# Patient Record
Sex: MALE | Race: BLACK OR AFRICAN AMERICAN | NOT HISPANIC OR LATINO | Employment: OTHER | URBAN - METROPOLITAN AREA
[De-identification: names, ages, dates, MRNs, and addresses within clinical notes are randomized per-mention and may not be internally consistent; named-entity substitution may affect disease eponyms.]

---

## 2023-03-29 DIAGNOSIS — N18.32 STAGE 3B CHRONIC KIDNEY DISEASE (MULTI): Primary | ICD-10-CM

## 2023-03-29 LAB
ALANINE AMINOTRANSFERASE (SGPT) (U/L) IN SER/PLAS: 14 U/L (ref 10–52)
ALBUMIN (G/DL) IN SER/PLAS: 3.9 G/DL (ref 3.4–5)
ALKALINE PHOSPHATASE (U/L) IN SER/PLAS: 72 U/L (ref 33–136)
ANION GAP IN SER/PLAS: 15 MMOL/L (ref 10–20)
ASPARTATE AMINOTRANSFERASE (SGOT) (U/L) IN SER/PLAS: 19 U/L (ref 9–39)
BILIRUBIN TOTAL (MG/DL) IN SER/PLAS: 0.8 MG/DL (ref 0–1.2)
CALCIUM (MG/DL) IN SER/PLAS: 9.4 MG/DL (ref 8.6–10.6)
CARBON DIOXIDE, TOTAL (MMOL/L) IN SER/PLAS: 24 MMOL/L (ref 21–32)
CHLORIDE (MMOL/L) IN SER/PLAS: 107 MMOL/L (ref 98–107)
CHOLESTEROL (MG/DL) IN SER/PLAS: 142 MG/DL (ref 0–199)
CHOLESTEROL IN HDL (MG/DL) IN SER/PLAS: 51.2 MG/DL
CHOLESTEROL/HDL RATIO: 2.8
CREATININE (MG/DL) IN SER/PLAS: 1.95 MG/DL (ref 0.5–1.3)
ERYTHROCYTE DISTRIBUTION WIDTH (RATIO) BY AUTOMATED COUNT: 11.9 % (ref 11.5–14.5)
ERYTHROCYTE MEAN CORPUSCULAR HEMOGLOBIN CONCENTRATION (G/DL) BY AUTOMATED: 31.5 G/DL (ref 32–36)
ERYTHROCYTE MEAN CORPUSCULAR VOLUME (FL) BY AUTOMATED COUNT: 91 FL (ref 80–100)
ERYTHROCYTES (10*6/UL) IN BLOOD BY AUTOMATED COUNT: 3.81 X10E12/L (ref 4.5–5.9)
ESTIMATED AVERAGE GLUCOSE FOR HBA1C: 128 MG/DL
GFR MALE: 37 ML/MIN/1.73M2
GLUCOSE (MG/DL) IN SER/PLAS: 117 MG/DL (ref 74–99)
HEMATOCRIT (%) IN BLOOD BY AUTOMATED COUNT: 34.6 % (ref 41–52)
HEMOGLOBIN (G/DL) IN BLOOD: 10.9 G/DL (ref 13.5–17.5)
HEMOGLOBIN A1C/HEMOGLOBIN TOTAL IN BLOOD: 6.1 %
LDL: 79 MG/DL (ref 0–99)
LEUKOCYTES (10*3/UL) IN BLOOD BY AUTOMATED COUNT: 6.8 X10E9/L (ref 4.4–11.3)
NRBC (PER 100 WBCS) BY AUTOMATED COUNT: 0 /100 WBC (ref 0–0)
PLATELETS (10*3/UL) IN BLOOD AUTOMATED COUNT: 168 X10E9/L (ref 150–450)
POTASSIUM (MMOL/L) IN SER/PLAS: 4.6 MMOL/L (ref 3.5–5.3)
PROSTATE SPECIFIC ANTIGEN,SCREEN: 1.57 NG/ML (ref 0–4)
PROTEIN TOTAL: 7 G/DL (ref 6.4–8.2)
SODIUM (MMOL/L) IN SER/PLAS: 141 MMOL/L (ref 136–145)
TRIGLYCERIDE (MG/DL) IN SER/PLAS: 59 MG/DL (ref 0–149)
UREA NITROGEN (MG/DL) IN SER/PLAS: 42 MG/DL (ref 6–23)
VLDL: 12 MG/DL (ref 0–40)

## 2023-04-09 DIAGNOSIS — N28.89 KIDNEY MASS: Primary | ICD-10-CM

## 2023-04-29 ENCOUNTER — APPOINTMENT (OUTPATIENT)
Dept: LAB | Facility: LAB | Age: 68
End: 2023-04-29
Payer: COMMERCIAL

## 2023-04-29 LAB
ANION GAP IN SER/PLAS: 13 MMOL/L (ref 10–20)
CALCIUM (MG/DL) IN SER/PLAS: 9.5 MG/DL (ref 8.6–10.6)
CARBON DIOXIDE, TOTAL (MMOL/L) IN SER/PLAS: 21 MMOL/L (ref 21–32)
CHLORIDE (MMOL/L) IN SER/PLAS: 109 MMOL/L (ref 98–107)
CREATININE (MG/DL) IN SER/PLAS: 2.56 MG/DL (ref 0.5–1.3)
GFR MALE: 27 ML/MIN/1.73M2
GLUCOSE (MG/DL) IN SER/PLAS: 110 MG/DL (ref 74–99)
POTASSIUM (MMOL/L) IN SER/PLAS: 6 MMOL/L (ref 3.5–5.3)
SODIUM (MMOL/L) IN SER/PLAS: 137 MMOL/L (ref 136–145)
UREA NITROGEN (MG/DL) IN SER/PLAS: 70 MG/DL (ref 6–23)

## 2023-07-06 ENCOUNTER — APPOINTMENT (OUTPATIENT)
Dept: LAB | Facility: LAB | Age: 68
End: 2023-07-06
Payer: COMMERCIAL

## 2023-07-06 LAB
ANION GAP IN SER/PLAS: 13 MMOL/L (ref 10–20)
CALCIUM (MG/DL) IN SER/PLAS: 9.2 MG/DL (ref 8.6–10.6)
CARBON DIOXIDE, TOTAL (MMOL/L) IN SER/PLAS: 25 MMOL/L (ref 21–32)
CHLORIDE (MMOL/L) IN SER/PLAS: 106 MMOL/L (ref 98–107)
CREATININE (MG/DL) IN SER/PLAS: 2.96 MG/DL (ref 0.5–1.3)
ERYTHROCYTE DISTRIBUTION WIDTH (RATIO) BY AUTOMATED COUNT: 12.8 % (ref 11.5–14.5)
ERYTHROCYTE MEAN CORPUSCULAR HEMOGLOBIN CONCENTRATION (G/DL) BY AUTOMATED: 31.4 G/DL (ref 32–36)
ERYTHROCYTE MEAN CORPUSCULAR VOLUME (FL) BY AUTOMATED COUNT: 94 FL (ref 80–100)
ERYTHROCYTES (10*6/UL) IN BLOOD BY AUTOMATED COUNT: 3.19 X10E12/L (ref 4.5–5.9)
GFR MALE: 23 ML/MIN/1.73M2
GLUCOSE (MG/DL) IN SER/PLAS: 107 MG/DL (ref 74–99)
HEMATOCRIT (%) IN BLOOD BY AUTOMATED COUNT: 29.9 % (ref 41–52)
HEMOGLOBIN (G/DL) IN BLOOD: 9.4 G/DL (ref 13.5–17.5)
LEUKOCYTES (10*3/UL) IN BLOOD BY AUTOMATED COUNT: 10.9 X10E9/L (ref 4.4–11.3)
NRBC (PER 100 WBCS) BY AUTOMATED COUNT: 0 /100 WBC (ref 0–0)
PLATELETS (10*3/UL) IN BLOOD AUTOMATED COUNT: 314 X10E9/L (ref 150–450)
POTASSIUM (MMOL/L) IN SER/PLAS: 5.3 MMOL/L (ref 3.5–5.3)
SODIUM (MMOL/L) IN SER/PLAS: 139 MMOL/L (ref 136–145)
UREA NITROGEN (MG/DL) IN SER/PLAS: 42 MG/DL (ref 6–23)

## 2023-07-07 LAB — CREATININE (MG/DL) IN BODY FLUID: 4.94 MG/DL

## 2023-07-17 LAB
ALANINE AMINOTRANSFERASE (SGPT) (U/L) IN SER/PLAS: 19 U/L (ref 10–52)
ALBUMIN (G/DL) IN SER/PLAS: 3.7 G/DL (ref 3.4–5)
ALKALINE PHOSPHATASE (U/L) IN SER/PLAS: 89 U/L (ref 33–136)
ANION GAP IN SER/PLAS: 12 MMOL/L (ref 10–20)
ASPARTATE AMINOTRANSFERASE (SGOT) (U/L) IN SER/PLAS: 24 U/L (ref 9–39)
BILIRUBIN TOTAL (MG/DL) IN SER/PLAS: 0.6 MG/DL (ref 0–1.2)
CALCIUM (MG/DL) IN SER/PLAS: 9.6 MG/DL (ref 8.6–10.6)
CARBON DIOXIDE, TOTAL (MMOL/L) IN SER/PLAS: 19 MMOL/L (ref 21–32)
CHLORIDE (MMOL/L) IN SER/PLAS: 109 MMOL/L (ref 98–107)
CREATININE (MG/DL) IN SER/PLAS: 2.3 MG/DL (ref 0.5–1.3)
GFR MALE: 31 ML/MIN/1.73M2
GLUCOSE (MG/DL) IN SER/PLAS: 104 MG/DL (ref 74–99)
POTASSIUM (MMOL/L) IN SER/PLAS: 5.4 MMOL/L (ref 3.5–5.3)
PROTEIN TOTAL: 6.9 G/DL (ref 6.4–8.2)
SODIUM (MMOL/L) IN SER/PLAS: 135 MMOL/L (ref 136–145)
UREA NITROGEN (MG/DL) IN SER/PLAS: 40 MG/DL (ref 6–23)

## 2023-07-26 LAB
ANION GAP IN SER/PLAS: 17 MMOL/L (ref 10–20)
CALCIUM (MG/DL) IN SER/PLAS: 9.9 MG/DL (ref 8.6–10.6)
CARBON DIOXIDE, TOTAL (MMOL/L) IN SER/PLAS: 20 MMOL/L (ref 21–32)
CHLORIDE (MMOL/L) IN SER/PLAS: 106 MMOL/L (ref 98–107)
CREATININE (MG/DL) IN SER/PLAS: 2.08 MG/DL (ref 0.5–1.3)
GFR MALE: 35 ML/MIN/1.73M2
GLUCOSE (MG/DL) IN SER/PLAS: 122 MG/DL (ref 74–99)
POTASSIUM (MMOL/L) IN SER/PLAS: 4.6 MMOL/L (ref 3.5–5.3)
SODIUM (MMOL/L) IN SER/PLAS: 138 MMOL/L (ref 136–145)
UREA NITROGEN (MG/DL) IN SER/PLAS: 47 MG/DL (ref 6–23)

## 2023-08-08 LAB
ANION GAP IN SER/PLAS: 11 MMOL/L (ref 10–20)
CALCIUM (MG/DL) IN SER/PLAS: 9.8 MG/DL (ref 8.6–10.6)
CARBON DIOXIDE, TOTAL (MMOL/L) IN SER/PLAS: 28 MMOL/L (ref 21–32)
CHLORIDE (MMOL/L) IN SER/PLAS: 106 MMOL/L (ref 98–107)
CREATININE (MG/DL) IN SER/PLAS: 1.83 MG/DL (ref 0.5–1.3)
GFR MALE: 40 ML/MIN/1.73M2
GLUCOSE (MG/DL) IN SER/PLAS: 106 MG/DL (ref 74–99)
POTASSIUM (MMOL/L) IN SER/PLAS: 4.6 MMOL/L (ref 3.5–5.3)
SODIUM (MMOL/L) IN SER/PLAS: 140 MMOL/L (ref 136–145)
UREA NITROGEN (MG/DL) IN SER/PLAS: 35 MG/DL (ref 6–23)

## 2023-08-28 DIAGNOSIS — E78.5 DYSLIPIDEMIA: ICD-10-CM

## 2023-08-29 RX ORDER — ATORVASTATIN CALCIUM 80 MG/1
80 TABLET, FILM COATED ORAL NIGHTLY
Qty: 90 TABLET | Refills: 0 | Status: SHIPPED | OUTPATIENT
Start: 2023-08-29 | End: 2023-11-20

## 2023-09-06 ENCOUNTER — OFFICE VISIT (OUTPATIENT)
Dept: PRIMARY CARE | Facility: CLINIC | Age: 68
End: 2023-09-06
Payer: COMMERCIAL

## 2023-09-06 VITALS
BODY MASS INDEX: 21.97 KG/M2 | WEIGHT: 132 LBS | DIASTOLIC BLOOD PRESSURE: 66 MMHG | HEART RATE: 62 BPM | SYSTOLIC BLOOD PRESSURE: 134 MMHG

## 2023-09-06 DIAGNOSIS — N18.31 STAGE 3A CHRONIC KIDNEY DISEASE (MULTI): ICD-10-CM

## 2023-09-06 DIAGNOSIS — I10 BENIGN ESSENTIAL HYPERTENSION: ICD-10-CM

## 2023-09-06 DIAGNOSIS — I25.10 ARTERIOSCLEROSIS OF CORONARY ARTERY: ICD-10-CM

## 2023-09-06 DIAGNOSIS — C64.2 RENAL CELL CARCINOMA, LEFT (MULTI): Primary | ICD-10-CM

## 2023-09-06 DIAGNOSIS — R73.9 HYPERGLYCEMIA: ICD-10-CM

## 2023-09-06 DIAGNOSIS — R73.01 IMPAIRED FASTING GLUCOSE: ICD-10-CM

## 2023-09-06 DIAGNOSIS — I50.22 CHRONIC SYSTOLIC CONGESTIVE HEART FAILURE (MULTI): ICD-10-CM

## 2023-09-06 DIAGNOSIS — Z23 NEEDS FLU SHOT: ICD-10-CM

## 2023-09-06 PROBLEM — N28.89 RIGHT RENAL MASS: Status: ACTIVE | Noted: 2023-09-06

## 2023-09-06 PROBLEM — D64.9 ANEMIA: Status: ACTIVE | Noted: 2023-09-06

## 2023-09-06 PROBLEM — E78.5 DYSLIPIDEMIA: Status: ACTIVE | Noted: 2023-01-31

## 2023-09-06 PROBLEM — E79.0 HYPERURICEMIA: Status: ACTIVE | Noted: 2023-09-06

## 2023-09-06 PROBLEM — N28.89 RIGHT RENAL MASS: Status: RESOLVED | Noted: 2023-09-06 | Resolved: 2023-09-06

## 2023-09-06 PROBLEM — N18.30 KIDNEY DISEASE, CHRONIC, STAGE III (GFR 30-59 ML/MIN) (MULTI): Status: ACTIVE | Noted: 2023-09-06

## 2023-09-06 PROCEDURE — 3075F SYST BP GE 130 - 139MM HG: CPT | Performed by: INTERNAL MEDICINE

## 2023-09-06 PROCEDURE — 90662 IIV NO PRSV INCREASED AG IM: CPT | Performed by: INTERNAL MEDICINE

## 2023-09-06 PROCEDURE — 1126F AMNT PAIN NOTED NONE PRSNT: CPT | Performed by: INTERNAL MEDICINE

## 2023-09-06 PROCEDURE — 1036F TOBACCO NON-USER: CPT | Performed by: INTERNAL MEDICINE

## 2023-09-06 PROCEDURE — 1160F RVW MEDS BY RX/DR IN RCRD: CPT | Performed by: INTERNAL MEDICINE

## 2023-09-06 PROCEDURE — 3078F DIAST BP <80 MM HG: CPT | Performed by: INTERNAL MEDICINE

## 2023-09-06 PROCEDURE — G0008 ADMIN INFLUENZA VIRUS VAC: HCPCS | Performed by: INTERNAL MEDICINE

## 2023-09-06 PROCEDURE — 1159F MED LIST DOCD IN RCRD: CPT | Performed by: INTERNAL MEDICINE

## 2023-09-06 PROCEDURE — 99214 OFFICE O/P EST MOD 30 MIN: CPT | Performed by: INTERNAL MEDICINE

## 2023-09-06 RX ORDER — LISINOPRIL 20 MG/1
20 TABLET ORAL DAILY
COMMUNITY
End: 2024-03-04

## 2023-09-06 RX ORDER — SPIRONOLACTONE 25 MG/1
25 TABLET ORAL DAILY
COMMUNITY
End: 2023-10-10 | Stop reason: SDUPTHER

## 2023-09-06 ASSESSMENT — PATIENT HEALTH QUESTIONNAIRE - PHQ9
SUM OF ALL RESPONSES TO PHQ9 QUESTIONS 1 AND 2: 0
2. FEELING DOWN, DEPRESSED OR HOPELESS: NOT AT ALL
1. LITTLE INTEREST OR PLEASURE IN DOING THINGS: NOT AT ALL

## 2023-09-06 NOTE — ASSESSMENT & PLAN NOTE
Impaired fasting glucose with A1c of 6.1 in March 2023.  We will recheck.  Advise low carbohydrate diet.  We will call with results of test

## 2023-09-06 NOTE — ASSESSMENT & PLAN NOTE
Status post partial nephrectomy.  Check renal function.  Continue following with urology for surveillance

## 2023-09-06 NOTE — ASSESSMENT & PLAN NOTE
Improvement of ejection fraction on current medications.  Continue Jardiance Lipitor lisinopril and spironolactone.  Check renal function to ensure potassium levels are within normal range.  Check weights daily and call if weight increases by 3 to 5 pounds

## 2023-09-06 NOTE — PROGRESS NOTES
Subjective   Patient ID: Keaton Blanco is a 67 y.o. male who presents for Follow-up.    HPI     Patient is a 67-year-old male with past medical history of systolic congestive heart failure coronary artery disease hyperglycemia renal cell carcinoma status post partial nephrectomy anemia hypertension who presents for follow-up.    Last month patient had a partial nephrectomy with successful removal of the RCC tumor.  He has a surveillance schedule with his urologist with plan for repeat imaging upcoming.  He did have a little bit of reduction in his GFR he is due for renal function at this time.  He was on spironolactone however was held due to hyper kalemia.  He needs a repeat renal function he is back on the spironolactone as well as his other medications for his congestive heart failure.    Systolic congestive heart failure.  No recent weight gain.  Recent echocardiogram improved to 40 to 45% EF.  He has been exercising more and no shortness of breath on exertion or orthopnea.    Chronic kidney disease stage III.  Likely partial nephrectomy contributing.  He is on ACE inhibitor as well as Farxiga    Review of Systems  Constitutional: No fever or chills  Cardiovascular: no chest pain, no palpitations and no syncope.   Respiratory: no cough, no shortness of breath during exertion and no shortness of breath at rest.   Gastrointestinal: no abdominal pain, no nausea and no vomiting.  Neuro: No Headache, no dizziness    Objective   /66   Pulse 62   Wt 59.9 kg (132 lb)   BMI 21.97 kg/m²     Physical Exam  Constitutional: Alert and in no acute distress. Well developed, well nourished  Head and Face: Head and face: Normal.    Cardiovascular: Heart rate and rhythm were normal, normal S1 and S2. No peripheral edema.   Pulmonary: No respiratory distress. Clear bilateral breath sounds.  Musculoskeletal: Gait and station: Normal. Muscle strength/tone: Normal.   Skin: Normal skin color and pigmentation, normal skin  turgor, and no rash.    Psychiatric: Judgment and insight: Intact. Mood and affect: Normal.        Lab Results   Component Value Date    WBC 9.5 07/13/2023    HGB 9.6 (L) 07/13/2023    HCT 29.1 (L) 07/13/2023     (H) 07/13/2023    CHOL 142 03/29/2023    TRIG 59 03/29/2023    HDL 51.2 03/29/2023    ALT 19 07/17/2023    AST 24 07/17/2023     08/08/2023    K 4.6 08/08/2023     08/08/2023    CREATININE 1.83 (H) 08/08/2023    BUN 35 (H) 08/08/2023    CO2 28 08/08/2023    INR 1.1 06/21/2023    HGBA1C 6.1 (A) 03/29/2023       Electrocardiogram 12 Lead  Please see ED Provider Note for formal interpretation  Confirmed by Billy Vora ([id number]) on 7/14/2023 4:54:00 AM            Assessment/Plan   Problem List Items Addressed This Visit       Renal cell carcinoma, left (CMS/[facility]) - Primary     Status post partial nephrectomy.  Check renal function.  Continue following with urology for surveillance         Relevant Orders    Renal function panel    Follow Up In [facility] - PCP - Medicare Annual    Arteriosclerosis of coronary artery    Benign essential hypertension     Controlled on current medications.  No medication adjustments         Chronic systolic congestive heart failure (CMS/[facility])     Improvement of ejection fraction on current medications.  Continue Jardiance Lipitor lisinopril and spironolactone.  Check renal function to ensure potassium levels are within normal range.  Check weights daily and call if weight increases by 3 to 5 pounds         Hyperglycemia     Impaired fasting glucose with A1c of 6.1 in March 2023.  We will recheck.  Advise low carbohydrate diet.  We will call with results of test         Kidney disease, chronic, stage III (GFR 30-59 ml/min) (CMS/[facility])     Check renal function.  Drink at least 1 L of fluid daily.  Avoid ibuprofen          Other Visit Diagnoses       Impaired fasting glucose        Relevant Orders    Hemoglobin A1C    Follow Up In [facility] Care -  PCP - Medicare Annual    Needs flu shot        Relevant Orders    Flu vaccine, quadrivalent, high-dose, preservative free, age 65y+ (FLUZONE)

## 2023-10-06 DIAGNOSIS — I50.22 CHRONIC SYSTOLIC CONGESTIVE HEART FAILURE (MULTI): Primary | ICD-10-CM

## 2023-10-10 DIAGNOSIS — I50.22 CHRONIC SYSTOLIC CONGESTIVE HEART FAILURE (MULTI): ICD-10-CM

## 2023-10-10 RX ORDER — SPIRONOLACTONE 25 MG/1
25 TABLET ORAL DAILY
Qty: 30 TABLET | Refills: 11 | Status: SHIPPED | OUTPATIENT
Start: 2023-10-10 | End: 2024-10-09

## 2023-10-19 RX ORDER — SPIRONOLACTONE 25 MG/1
25 TABLET ORAL DAILY
Qty: 30 TABLET | Refills: 0 | Status: SHIPPED | OUTPATIENT
Start: 2023-10-19 | End: 2024-03-14 | Stop reason: ALTCHOICE

## 2023-11-18 DIAGNOSIS — E78.5 DYSLIPIDEMIA: ICD-10-CM

## 2023-11-20 RX ORDER — ATORVASTATIN CALCIUM 80 MG/1
80 TABLET, FILM COATED ORAL NIGHTLY
Qty: 90 TABLET | Refills: 1 | Status: SHIPPED | OUTPATIENT
Start: 2023-11-20 | End: 2024-05-20

## 2024-01-19 RX ORDER — CARVEDILOL 25 MG/1
25 TABLET ORAL 2 TIMES DAILY
COMMUNITY
Start: 2023-10-25 | End: 2024-02-28

## 2024-01-23 ENCOUNTER — OFFICE VISIT (OUTPATIENT)
Dept: CARDIOLOGY | Facility: HOSPITAL | Age: 69
End: 2024-01-23
Payer: COMMERCIAL

## 2024-01-23 VITALS
HEART RATE: 60 BPM | SYSTOLIC BLOOD PRESSURE: 158 MMHG | DIASTOLIC BLOOD PRESSURE: 80 MMHG | OXYGEN SATURATION: 98 % | WEIGHT: 135 LBS | HEIGHT: 65 IN | BODY MASS INDEX: 22.49 KG/M2

## 2024-01-23 DIAGNOSIS — I10 BENIGN ESSENTIAL HYPERTENSION: ICD-10-CM

## 2024-01-23 DIAGNOSIS — I25.10 ARTERIOSCLEROSIS OF CORONARY ARTERY: Primary | ICD-10-CM

## 2024-01-23 DIAGNOSIS — I50.22 CHRONIC SYSTOLIC CONGESTIVE HEART FAILURE (MULTI): ICD-10-CM

## 2024-01-23 DIAGNOSIS — E78.5 DYSLIPIDEMIA: ICD-10-CM

## 2024-01-23 PROCEDURE — 3079F DIAST BP 80-89 MM HG: CPT | Performed by: INTERNAL MEDICINE

## 2024-01-23 PROCEDURE — 3077F SYST BP >= 140 MM HG: CPT | Performed by: INTERNAL MEDICINE

## 2024-01-23 PROCEDURE — 1036F TOBACCO NON-USER: CPT | Performed by: INTERNAL MEDICINE

## 2024-01-23 PROCEDURE — 1159F MED LIST DOCD IN RCRD: CPT | Performed by: INTERNAL MEDICINE

## 2024-01-23 PROCEDURE — 99213 OFFICE O/P EST LOW 20 MIN: CPT | Performed by: INTERNAL MEDICINE

## 2024-01-23 PROCEDURE — 1126F AMNT PAIN NOTED NONE PRSNT: CPT | Performed by: INTERNAL MEDICINE

## 2024-01-23 PROCEDURE — 1160F RVW MEDS BY RX/DR IN RCRD: CPT | Performed by: INTERNAL MEDICINE

## 2024-01-23 ASSESSMENT — PAIN SCALES - GENERAL: PAINLEVEL: 0-NO PAIN

## 2024-01-23 NOTE — PROGRESS NOTES
Subjective   Keaton Blanco is a 68 y.o. male who presents to the [facility] for follow up of hypertensive heart disease with systolic heart failure. Last seen in July 2023.     Since our last visit, Mr. Blanco has no active cardiac symptoms of chest pain, dyspnea on exertion, PND, orthopnea, NOLBERTO, palpitations, syncope, or claudication. Now back to exercising c/w NYHA class I symptoms. Mr. Blanco walks his dog 2-3 miles and does moderate intensity exercise without dyspnea.      Has not had to take Lasix 40 mg tablets since I instructed him to take PRN after 9/11/2021 renal panel showed bump in Cr after starting spironolactone.    Admitted with hypertensive emergency and found to have newly diagnosed systolic heart failure (LV EF 25-30%) in July 2021. Had symptoms of exertional dyspnea, headache, fatigue prior to admission. Since discharge, home BP trend is now 120-130 mm / 70s mm Hg. Elevated BP today but patient states high anxiety at today's visit.      Past Medical History:  1. Chronic systolic heart failure: 7/28/2021 LV EF 25% -> 10/12/2021 LV EF 40-45%  2. Coronary arteriosclerosis: 10/13/2021 CT calcium score 909  3. Hypertension  4. Dyslipidemia  5. Impaired glucose tolerance (7/2021 A1c 6.0%; 3/2022 6.3%)  6. CKD stage 3 (Cr b/l 1.7)     Social History:  Never a smoker     Family History:  No premature CAD in 1st degree relatives ( 55 years of age for male relatives, 65 years of age for female relatives)     Review of Systems    A 14 point review of systems was asked. All questions were negative except for pertinent positives listed in the HPI.     Current Outpatient Medications on File Prior to Visit   Medication Sig Dispense Refill    atorvastatin (Lipitor) 80 mg tablet Take 1 tablet (80 mg) by mouth once daily at bedtime. 90 tablet 1    carvedilol (Coreg) 25 mg tablet Take 1 tablet (25 mg) by mouth 2 times a day.      empagliflozin (Jardiance) 10 mg Take 1 tablet (10 mg) by  "mouth once daily.      lisinopril 20 mg tablet Take 1 tablet (20 mg) by mouth once daily. for blood pressure      spironolactone (Aldactone) 25 mg tablet Take 1 tablet by mouth once daily 30 tablet 0    spironolactone (Aldactone) 25 mg tablet TAKE 1 TABLET BY MOUTH ONCE DAILY 30 tablet 11     No current facility-administered medications on file prior to visit.     Objective   Physical Exam  BP Readings from Last 3 Encounters:   01/23/24 158/80   09/06/23 134/66   07/25/23 147/78      Wt Readings from Last 3 Encounters:   01/23/24 61.2 kg (135 lb)   09/06/23 59.9 kg (132 lb)   07/25/23 59.1 kg (130 lb 6 oz)      BMI: Estimated body mass index is 22.47 kg/m² as calculated from the following:    Height as of this encounter: 1.651 m (5' 5\").    Weight as of this encounter: 61.2 kg (135 lb).  BSA: Estimated body surface area is 1.68 meters squared as calculated from the following:    Height as of this encounter: 1.651 m (5' 5\").    Weight as of this encounter: 61.2 kg (135 lb).    General: no acute distress  HEENT: EOMI, no scleral icterus.  Lungs: Clear to auscultation bilaterally without wheezing, rales, or rhonchi.  Cardiovascular: Regular rhythm and rate. Normal S1 and S2. No murmurs, rubs, or gallops are appreciated. JVP normal.  Abdomen: Soft, nontender, nondistended. Bowel sounds present.  Extremities: Warm and well perfused with equal 2+ pulses bilaterally.  No edema present.  Neurologic: Alert and oriented x3.    I have personally reviewed the following images and laboratory findings:  Last echocardiogram:  Most recent echo, 10/12/2021: LV EF 40-45%, borderline conc LVH (LVMI 113 gm/m2), impaired relaxation diastology (E/e' 12), upper limits of normal LA size (DEWAYNE 32 ml/m2), normal RV/RA, mild AI, trace TR, trace MR, no RVSP measured, mild Asc Ao enlargement (4.0 cm).     Prior echo, 7/28/2021: LV EF 25-30%, mod LVH (LVMI 153 gm/m2), mild LV dilation (LVEDD/ESD 5.2 / 4.3 cm), impaired relaxation diastology " "(E/e' 9.9), normal LA size (DEWAYNE 19 ml/m2), normal RV/RA, no AI, mild MR, mild TR, RVSP 21 mm Hg, Asc Ao 3.8 cm.     Last cath / stress test / CACS:  10/2021 CT calcium score: 909    Most recent EC2023 ECG: Sinus rhythm, 66 bpm, normal ECG. Personally reviewed in office.     Lab Results   Component Value Date    CHOL 142 2023    CHOL 123 2022     Lab Results   Component Value Date    HDL 51.2 2023    HDL 45.6 2022     No results found for: \"LDLCALC\"  Lab Results   Component Value Date    TRIG 59 2023    TRIG 46 2022     No components found for: \"CHOLHDL\"      Assessment/Plan   1. Chronic systolic heart failure:  2021 TTE: LV EF 25-30% -> 10/12/2021 echo 40-45%. Continue neurohormonal remodeling with carvedilol 25 mg twice a day, lisinopril 20 mg a day, and spironolactone 25 mg a day now. Lasix 40 mg PRN for 2 lb weight gain, has not used since our last visit in 2022. No indication for ICD now that LV EF > 35%.     Jardiance 10 mg a day co-pay was too high after prescriptions sent in 2022 and 2023. We attempted to use company assistance program without success. Will consider again in late  or  after Medicare drug cost legislation program details are announced.     2. Coronary arteriosclerosis / Dyslipidemia:  10/13/2021 CT calcium score high at 909. Taking aspirin 81 mg a day and high intensity statin (atorvastatin 80 mg a day). LDL at goal < 70. Lifestyle modification program review of heart healthy diet and aerobic exercise.     Check repeat fasting lipid panel with next blood work lab visit.     3. Hypertension:  Continue current medications. Low sodium diet (3741-6864 mg sodium a day). Keep BP log book. BP running lower at home that at office visit (120-140 mm Hg). Continue current medications amlodipine 10 mg a day, lisinopril 20 mg a day, and spironolactone 25 mg a day.      Follow up with Dr. Hernandez in 6 months.        "     SIGNATURE: Sarath Hernandez MD PATIENT NAME: Keaton Blanco   DATE/TIME: January 23, 2024 2:42 PM MRN: [id number]

## 2024-01-23 NOTE — PATIENT INSTRUCTIONS
You were seen in the [facility] for your history of high blood pressure and systolic heart failure.     Your echocardiogram in October 2021 showed that your heart's squeezing strength (ejection fraction) had improved to 40-45% from 25% in July 2021 with heart failure medication therapy. Your pumping strength is now mildly reduced and near the cut off of 50% and above that would be normal range.      We are treating your heart failure with the following medications:  1. Carvedilol 25 mg twice a day  2. Lisinopril 20 mg a day  3. Spironolactone 25 mg a day     Furosemide (Lasix) 40 mg a day as needed for 2 pound weight gain or ankle swelling.      Check your blood pressure every morning about 30-60 minutes after taking your morning blood pressure medications and keep a log book. Sit for 3-5 minutes in a chair to relax and place your arm on a table or counter to be level with your heart.      Eat a low salt diet (sodium limit of 2486-9503 mg a day) to help lower your blood pressure. Extra sodium causes an increase in the volume of blood inside your blood vessels and this increases your blood pressure.      Your October 2021 CT calcium score was high at 909. We are treating your heart artery atherosclerosis with aspirin 81 mg a day, atorvastatin 80 mg a day, and lifestyle modification program of heart healthy eat and aerobic exercise.     Eat a heart healthy diet. Limit portions of red meat. Eat fresh fruits and vegetables instead of processed carbohydrates. Olive oil (1 tablespoon a day), avocados, tree nuts as a source of omega-3 fat. Beans and legumes are good sources of plant based protein and fiber. The Mediterranean diet has been shown in clinical trials to reduce risk of heart disease by following these principles.      Aerobic exercise 30 minutes 3-5 times a week can help lower blood pressure and protect your heart.      Your March 2022 fasting cholesterol blood work was at goal taking  atorvastatin 80 mg a day. I ordered repeat fasting cholesterol blood work for you get next time you go to the lab.     Follow up with Dr. Hernandez in 6 months.

## 2024-02-01 ENCOUNTER — HOSPITAL ENCOUNTER (OUTPATIENT)
Dept: RADIOLOGY | Facility: HOSPITAL | Age: 69
Discharge: HOME | End: 2024-02-01
Payer: COMMERCIAL

## 2024-02-01 DIAGNOSIS — C64.2 MALIGNANT NEOPLASM OF LEFT KIDNEY, EXCEPT RENAL PELVIS (MULTI): ICD-10-CM

## 2024-02-01 PROCEDURE — 71046 X-RAY EXAM CHEST 2 VIEWS: CPT | Performed by: RADIOLOGY

## 2024-02-01 PROCEDURE — A9575 INJ GADOTERATE MEGLUMI 0.1ML: HCPCS | Performed by: STUDENT IN AN ORGANIZED HEALTH CARE EDUCATION/TRAINING PROGRAM

## 2024-02-01 PROCEDURE — 71046 X-RAY EXAM CHEST 2 VIEWS: CPT

## 2024-02-01 PROCEDURE — 2550000001 HC RX 255 CONTRASTS: Performed by: STUDENT IN AN ORGANIZED HEALTH CARE EDUCATION/TRAINING PROGRAM

## 2024-02-01 PROCEDURE — 74183 MRI ABD W/O CNTR FLWD CNTR: CPT | Performed by: RADIOLOGY

## 2024-02-01 PROCEDURE — 74183 MRI ABD W/O CNTR FLWD CNTR: CPT

## 2024-02-01 RX ORDER — GADOTERATE MEGLUMINE 376.9 MG/ML
13 INJECTION INTRAVENOUS
Status: COMPLETED | OUTPATIENT
Start: 2024-02-01 | End: 2024-02-01

## 2024-02-01 RX ADMIN — GADOTERATE MEGLUMINE 12 ML: 376.9 INJECTION INTRAVENOUS at 10:02

## 2024-02-06 ENCOUNTER — OFFICE VISIT (OUTPATIENT)
Dept: UROLOGY | Facility: HOSPITAL | Age: 69
End: 2024-02-06
Payer: COMMERCIAL

## 2024-02-06 VITALS
WEIGHT: 135.58 LBS | HEART RATE: 58 BPM | RESPIRATION RATE: 20 BRPM | TEMPERATURE: 97.5 F | OXYGEN SATURATION: 100 % | DIASTOLIC BLOOD PRESSURE: 75 MMHG | SYSTOLIC BLOOD PRESSURE: 147 MMHG | BODY MASS INDEX: 22.56 KG/M2

## 2024-02-06 DIAGNOSIS — C64.2 RENAL CELL CARCINOMA, LEFT (MULTI): ICD-10-CM

## 2024-02-06 PROCEDURE — 1036F TOBACCO NON-USER: CPT | Performed by: STUDENT IN AN ORGANIZED HEALTH CARE EDUCATION/TRAINING PROGRAM

## 2024-02-06 PROCEDURE — 1126F AMNT PAIN NOTED NONE PRSNT: CPT | Performed by: STUDENT IN AN ORGANIZED HEALTH CARE EDUCATION/TRAINING PROGRAM

## 2024-02-06 PROCEDURE — 99213 OFFICE O/P EST LOW 20 MIN: CPT | Performed by: STUDENT IN AN ORGANIZED HEALTH CARE EDUCATION/TRAINING PROGRAM

## 2024-02-06 PROCEDURE — 1159F MED LIST DOCD IN RCRD: CPT | Performed by: STUDENT IN AN ORGANIZED HEALTH CARE EDUCATION/TRAINING PROGRAM

## 2024-02-06 PROCEDURE — 3078F DIAST BP <80 MM HG: CPT | Performed by: STUDENT IN AN ORGANIZED HEALTH CARE EDUCATION/TRAINING PROGRAM

## 2024-02-06 PROCEDURE — 3077F SYST BP >= 140 MM HG: CPT | Performed by: STUDENT IN AN ORGANIZED HEALTH CARE EDUCATION/TRAINING PROGRAM

## 2024-02-06 RX ORDER — ASPIRIN 81 MG/1
81 TABLET ORAL DAILY
COMMUNITY

## 2024-02-06 ASSESSMENT — PAIN SCALES - GENERAL: PAINLEVEL: 0-NO PAIN

## 2024-02-06 NOTE — ASSESSMENT & PLAN NOTE
I reviewed his imaging in surveillance and we discussed this in detail. MRI Kidney showed no evidence of recurrence.   We again discussed the low risk of recurrence for his clear cell papillary tumour and the very low risk of progression.   I would recommend we continue on active surveillance q 1 year with MRI Kidney and BMP. The patient understands and agrees with this plan.

## 2024-02-06 NOTE — PROGRESS NOTES
Subjective     Keaton Blanco is a 68 y.o. 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 This was not staged because these tumors do not get staged.     The patient is doing well. He denies issues.      He had an uneventful post-operative course.   His eGFR over the last few years has ranged from 27-50.      Past medical, surgical, family and social history were reviewed and unchanged since last visit besides what is in the HPI.            Review of Systems   All other systems reviewed and are negative.      Objective   Physical Exam  Gen: No acute distress     Psych: Alert and oriented x3     Neuro:  Normal ROM    Resp: Nonlabored respirations     CV: Regular rate and rhythm     Abd: S, NT, ND.     : Deferred    Skin: Warm, dry and intact without rashes     Lymphatics: No peripheral edema       Assessment/Plan   Problem List Items Addressed This Visit             ICD-10-CM    Renal cell carcinoma, left (CMS/HCC) - Primary C64.2       I reviewed his imaging in surveillance and we discussed this in detail. MRI Kidney showed no evidence of recurrence.   We again discussed the low risk of recurrence for his clear cell papillary tumour and the very low risk of progression.   I would recommend we continue on active surveillance q 1 year with MRI Kidney and BMP. The patient understands and agrees with this plan.                          Plan:  1 year FUV with MRI Kidney, BMP          Scribe Attestation  By signing my name below, I, Katelyn Casalla, Scribe   attest that this documentation has been prepared under the direction and in the presence of Marito Brown MD MPH.

## 2024-02-26 DIAGNOSIS — I10 BENIGN ESSENTIAL HYPERTENSION: Primary | ICD-10-CM

## 2024-02-27 RX ORDER — AMLODIPINE BESYLATE 10 MG/1
10 TABLET ORAL DAILY
Qty: 90 TABLET | Refills: 3 | Status: SHIPPED | OUTPATIENT
Start: 2024-02-27

## 2024-02-28 DIAGNOSIS — I25.10 ARTERIOSCLEROSIS OF CORONARY ARTERY: ICD-10-CM

## 2024-02-28 RX ORDER — CARVEDILOL 25 MG/1
25 TABLET ORAL 2 TIMES DAILY
Qty: 180 TABLET | Refills: 3 | Status: SHIPPED | OUTPATIENT
Start: 2024-02-28

## 2024-03-04 DIAGNOSIS — I50.22 CHRONIC SYSTOLIC CONGESTIVE HEART FAILURE (MULTI): Primary | ICD-10-CM

## 2024-03-04 RX ORDER — LISINOPRIL 20 MG/1
20 TABLET ORAL DAILY
Qty: 90 TABLET | Refills: 3 | Status: SHIPPED | OUTPATIENT
Start: 2024-03-04

## 2024-03-06 ENCOUNTER — OFFICE VISIT (OUTPATIENT)
Dept: PRIMARY CARE | Facility: CLINIC | Age: 69
End: 2024-03-06
Payer: COMMERCIAL

## 2024-03-06 VITALS
DIASTOLIC BLOOD PRESSURE: 67 MMHG | OXYGEN SATURATION: 98 % | WEIGHT: 141 LBS | HEART RATE: 57 BPM | SYSTOLIC BLOOD PRESSURE: 139 MMHG | BODY MASS INDEX: 23.46 KG/M2

## 2024-03-06 DIAGNOSIS — C64.2 RENAL CELL CARCINOMA, LEFT (MULTI): ICD-10-CM

## 2024-03-06 DIAGNOSIS — I50.22 CHRONIC SYSTOLIC CONGESTIVE HEART FAILURE (MULTI): ICD-10-CM

## 2024-03-06 DIAGNOSIS — R73.01 IMPAIRED FASTING GLUCOSE: ICD-10-CM

## 2024-03-06 DIAGNOSIS — M10.9 GOUT, UNSPECIFIED CAUSE, UNSPECIFIED CHRONICITY, UNSPECIFIED SITE: Primary | ICD-10-CM

## 2024-03-06 DIAGNOSIS — N18.31 STAGE 3A CHRONIC KIDNEY DISEASE (MULTI): ICD-10-CM

## 2024-03-06 DIAGNOSIS — I10 BENIGN ESSENTIAL HYPERTENSION: ICD-10-CM

## 2024-03-06 PROCEDURE — 99213 OFFICE O/P EST LOW 20 MIN: CPT | Performed by: INTERNAL MEDICINE

## 2024-03-06 PROCEDURE — 1123F ACP DISCUSS/DSCN MKR DOCD: CPT | Performed by: INTERNAL MEDICINE

## 2024-03-06 PROCEDURE — 1159F MED LIST DOCD IN RCRD: CPT | Performed by: INTERNAL MEDICINE

## 2024-03-06 PROCEDURE — 3078F DIAST BP <80 MM HG: CPT | Performed by: INTERNAL MEDICINE

## 2024-03-06 PROCEDURE — 1036F TOBACCO NON-USER: CPT | Performed by: INTERNAL MEDICINE

## 2024-03-06 PROCEDURE — 1160F RVW MEDS BY RX/DR IN RCRD: CPT | Performed by: INTERNAL MEDICINE

## 2024-03-06 PROCEDURE — 3075F SYST BP GE 130 - 139MM HG: CPT | Performed by: INTERNAL MEDICINE

## 2024-03-06 PROCEDURE — 1126F AMNT PAIN NOTED NONE PRSNT: CPT | Performed by: INTERNAL MEDICINE

## 2024-03-06 NOTE — PROGRESS NOTES
Subjective   Patient ID: Keaton Blanco is a 68 y.o. male who presents for Follow-up.    HPI     Patient is a 67-year-old male with past medical history of systolic congestive heart failure coronary artery disease hyperglycemia renal cell carcinoma status post partial nephrectomy anemia hypertension who presents for follow-up.    patient had a partial nephrectomy with successful removal of the RCC tumor.  He has a surveillance schedule with his urologist with plan for repeat imaging upcoming.  He did have a little bit of reduction in his GFR he is due for renal function at this time.  He was on spironolactone however was held due to hyper kalemia.  He needs a repeat renal function he is back on the spironolactone as well as his other medications for his congestive heart failure.  He did not complete the renal function    Systolic congestive heart failure.  No recent weight gain.  Recent echocardiogram improved to 40 to 45% EF.  He has been exercising more and no shortness of breath on exertion or orthopnea.    Chronic kidney disease stage III.  Likely partial nephrectomy contributing.  He is on ACE inhibitor as well as Farxiga    Review of Systems  Constitutional: No fever or chills  Cardiovascular: no chest pain, no palpitations and no syncope.   Respiratory: no cough, no shortness of breath during exertion and no shortness of breath at rest.   Gastrointestinal: no abdominal pain, no nausea and no vomiting.  Neuro: No Headache, no dizziness    Objective   /67   Pulse 57   Wt 64 kg (141 lb)   SpO2 98%   BMI 23.46 kg/m²     Physical Exam  Constitutional: Alert and in no acute distress. Well developed, well nourished  Head and Face: Head and face: Normal.    Cardiovascular: Heart rate and rhythm were normal, normal S1 and S2. No peripheral edema.   Pulmonary: No respiratory distress. Clear bilateral breath sounds.  Musculoskeletal: Gait and station: Normal. Muscle strength/tone: Normal.   Skin: Normal skin  color and pigmentation, normal skin turgor, and no rash.    Psychiatric: Judgment and insight: Intact. Mood and affect: Normal.        Lab Results   Component Value Date    WBC 9.5 07/13/2023    HGB 9.6 (L) 07/13/2023    HCT 29.1 (L) 07/13/2023     (H) 07/13/2023    CHOL 142 03/29/2023    TRIG 59 03/29/2023    HDL 51.2 03/29/2023    ALT 19 07/17/2023    AST 24 07/17/2023     08/08/2023    K 4.6 08/08/2023     08/08/2023    CREATININE 1.83 (H) 08/08/2023    BUN 35 (H) 08/08/2023    CO2 28 08/08/2023    INR 1.1 06/21/2023    HGBA1C 6.1 (A) 03/29/2023       XR chest 2 views  Narrative: Interpreted By:  Schoenberger, Joseph,   STUDY:  XR CHEST 2 VIEWS;  2/1/2024 10:32 am      INDICATION:  Signs/Symptoms: RCC  C64.2: Renal cell carcinoma, left.      COMPARISON:  None.      ACCESSION NUMBER(S):  [id number]      ORDERING CLINICIAN:  ALEJANDRO FELIX      FINDINGS:                  CARDIOMEDIASTINAL SILHOUETTE:  Cardiomediastinal silhouette is normal in size and configuration.      LUNGS:  Lungs are clear.      ABDOMEN:  No remarkable upper abdominal findings.      BONES:  No acute osseous changes.      Impression: 1.  No evidence of acute cardiopulmonary process.              MACRO:  None      Signed by: Joseph Schoenberger 2/2/2024 2:52 PM  Dictation workstation:   [id number]  MR abdomen renal w and wo IV contrast  Narrative: Interpreted By:  Iam Carmichael,   STUDY:  MR ABDOMEN RENAL W AND WO IV CONTRAST; ;  2/1/2024 10:09 am      INDICATION:  Signs/Symptoms: RCC  C64.2: Renal cell carcinoma, left. Right renal  cell carcinoma status post right partial nephrectomy on 06/26/2023.      COMPARISON:  05/10/2023.      ACCESSION NUMBER(S):  [id number]      ORDERING CLINICIAN:  ALEJANDRO FELIX      TECHNIQUE:  Multisequence multiplanar imaging of the abdomen was performed with  attention to the kidneys. Sequences were obtained both without as  well as following intravenous administration of 12 mL  Dotarem  contrast.      FINDINGS:  Some motion artifact is present.      Liver is not enlarged. No significant signal loss is seen throughout  the hepatic parenchyma on opposed phase sequences. Scattered  subcentimeter T2 hyperintense cysts throughout the liver are similar  to prior. No new focal hepatic lesion is seen.      Gallbladder is partially contracted containing small folds. No  definite gallstones. No intrahepatic or extrahepatic biliary dilation  is seen.      Spleen is not enlarged. No discrete splenic lesion.      Mild diffuse prominence of the main pancreatic duct measuring 3 mm in  diameter is similar to prior. There is conventional pancreatic ductal  anatomy. No focal pancreatic lesion is seen.      Adrenal glands are unremarkable.      There has been interval partial right nephrectomy with resection of  the previously described right renal solid enhancing mass. Right  renal interpolar T2 hypointense scarring is now seen at the partial  nephrectomy site. Along the posterolateral margin of the right kidney  near the partial nephrectomy site there is a lobulated predominantly  T1 hypointense fluid collection which tracks from the upper pole to  the mid aspect of the right kidney. On T2 sequences this small  collection exhibits varying degrees of T2 hyperintensity. The more  focal T2 hyperintense component of this small collection at the mid  kidney level measures 2.3 x 2.1 cm in axial dimension. Following  contrast administration there is mild enhancement along the  peripheral margin of this collection without intrinsic nodular  enhancement. No definite residual enhancement is seen in the right  kidney at the partial nephrectomy site.      Multiple varying sized right renal simple cysts and complicated cysts  containing debris are again seen. No enhancing right renal lesion is  seen. No right hydronephrosis.      Multiple varying sized left renal simple cysts and complicated cysts  containing debris  are again seen. No enhancing left renal lesion is  seen. No left hydronephrosis.      No abdominal or retroperitoneal lymphadenopathy is seen. No aortic  aneurysm. IVC is unremarkable.      Included portions of large and small bowel are not abnormally dilated.      Thoracolumbar mild dextrocurvature may be partially exaggerated by  positioning.      Impression: Interval partial right nephrectomy with resection of the previously  described right renal solid mass/neoplasm. No appreciable residual  enhancement of the right kidney at the partial nephrectomy site.      New lobulated elongated right perinephric fluid collection extending  from the right renal pole to the mid aspect of the right kidney near  the partial nephrectomy site. Mild peripheral enhancement without  intrinsic nodular enhancement. This most likely represents a small  postoperative fluid collection or residual hematoma. Continued  surveillance recommended.      Both kidneys contain additional innumerable varying sized simple  cysts as well as complicated cysts containing debris. No enhancing  renal lesion is seen bilaterally. No hydronephrosis bilaterally.      No definite new evidence to suggest metastatic disease throughout the  abdomen.          MACRO:  None      Signed by: Iam Carmichael 2/2/2024 2:43 PM  Dictation workstation:   [id number]            Assessment/Plan   Problem List Items Addressed This Visit       Renal cell carcinoma, left (CMS/HCC)    Benign essential hypertension     Improved with adjustments to medications.  Will continue current medications and recheck in 3 to 6 months at Medicare wellness.  At that time can adjust medications         Chronic systolic congestive heart failure (CMS/HCC)     Clinically euvolemic  Continue current medications including SGLT2 beta-blocker ACE inhibitor and ARB  Continue following with cardiology         Kidney disease, chronic, stage III (GFR 30-59 ml/min) (CMS/HCC)    Relevant Orders     Follow Up In [facility] - [facility] - Medicare Annual     Other Visit Diagnoses       Gout, unspecified cause, unspecified chronicity, unspecified site    -  Primary    Relevant Orders    Uric acid    Impaired fasting glucose        Relevant Orders    Follow Up In [facility] - [facility] - Medicare Annual

## 2024-03-06 NOTE — ASSESSMENT & PLAN NOTE
Improved with adjustments to medications.  Will continue current medications and recheck in 3 to 6 months at Medicare wellness.  At that time can adjust medications

## 2024-03-06 NOTE — ASSESSMENT & PLAN NOTE
Clinically euvolemic  Continue current medications including SGLT2 beta-blocker ACE inhibitor and ARB  Continue following with cardiology

## 2024-03-08 ENCOUNTER — LAB (OUTPATIENT)
Dept: LAB | Facility: LAB | Age: 69
End: 2024-03-08
Payer: COMMERCIAL

## 2024-03-08 DIAGNOSIS — N18.32 STAGE 3B CHRONIC KIDNEY DISEASE (MULTI): ICD-10-CM

## 2024-03-08 DIAGNOSIS — E78.5 DYSLIPIDEMIA: ICD-10-CM

## 2024-03-08 DIAGNOSIS — M10.9 GOUT, UNSPECIFIED CAUSE, UNSPECIFIED CHRONICITY, UNSPECIFIED SITE: ICD-10-CM

## 2024-03-08 DIAGNOSIS — C64.2 RENAL CELL CARCINOMA, LEFT (MULTI): ICD-10-CM

## 2024-03-08 DIAGNOSIS — I25.10 ARTERIOSCLEROSIS OF CORONARY ARTERY: ICD-10-CM

## 2024-03-08 DIAGNOSIS — R73.01 IMPAIRED FASTING GLUCOSE: ICD-10-CM

## 2024-03-08 LAB
ALBUMIN SERPL BCP-MCNC: 3.7 G/DL (ref 3.4–5)
ANION GAP SERPL CALC-SCNC: 16 MMOL/L (ref 10–20)
BUN SERPL-MCNC: 56 MG/DL (ref 6–23)
CALCIUM SERPL-MCNC: 9.7 MG/DL (ref 8.6–10.6)
CHLORIDE SERPL-SCNC: 106 MMOL/L (ref 98–107)
CHOLEST SERPL-MCNC: 131 MG/DL (ref 0–199)
CHOLESTEROL/HDL RATIO: 2.8
CO2 SERPL-SCNC: 21 MMOL/L (ref 21–32)
CREAT SERPL-MCNC: 2.37 MG/DL (ref 0.5–1.3)
CREAT UR-MCNC: 139.2 MG/DL (ref 20–370)
EGFRCR SERPLBLD CKD-EPI 2021: 29 ML/MIN/1.73M*2
EST. AVERAGE GLUCOSE BLD GHB EST-MCNC: 126 MG/DL
GLUCOSE SERPL-MCNC: 104 MG/DL (ref 74–99)
HBA1C MFR BLD: 6 %
HDLC SERPL-MCNC: 47.2 MG/DL
LDLC SERPL CALC-MCNC: 76 MG/DL
MICROALBUMIN UR-MCNC: 14.2 MG/L
MICROALBUMIN/CREAT UR: 10.2 UG/MG CREAT
NON HDL CHOLESTEROL: 84 MG/DL (ref 0–149)
PHOSPHATE SERPL-MCNC: 4 MG/DL (ref 2.5–4.9)
POTASSIUM SERPL-SCNC: 5.6 MMOL/L (ref 3.5–5.3)
SODIUM SERPL-SCNC: 137 MMOL/L (ref 136–145)
TRIGL SERPL-MCNC: 41 MG/DL (ref 0–149)
URATE SERPL-MCNC: 9.8 MG/DL (ref 4–7.5)
VLDL: 8 MG/DL (ref 0–40)

## 2024-03-08 PROCEDURE — 82043 UR ALBUMIN QUANTITATIVE: CPT

## 2024-03-08 PROCEDURE — 80061 LIPID PANEL: CPT

## 2024-03-08 PROCEDURE — 80069 RENAL FUNCTION PANEL: CPT

## 2024-03-08 PROCEDURE — 36415 COLL VENOUS BLD VENIPUNCTURE: CPT

## 2024-03-08 PROCEDURE — 84550 ASSAY OF BLOOD/URIC ACID: CPT

## 2024-03-08 PROCEDURE — 83036 HEMOGLOBIN GLYCOSYLATED A1C: CPT

## 2024-03-08 PROCEDURE — 82570 ASSAY OF URINE CREATININE: CPT

## 2024-03-14 DIAGNOSIS — E79.0 ELEVATED URIC ACID IN BLOOD: Primary | ICD-10-CM

## 2024-03-14 DIAGNOSIS — N18.4 CKD (CHRONIC KIDNEY DISEASE) STAGE 4, GFR 15-29 ML/MIN (MULTI): Primary | ICD-10-CM

## 2024-03-14 RX ORDER — ALLOPURINOL 100 MG/1
100 TABLET ORAL DAILY
Qty: 30 TABLET | Refills: 11 | Status: SHIPPED | OUTPATIENT
Start: 2024-03-14 | End: 2025-03-14

## 2024-05-18 DIAGNOSIS — E78.5 DYSLIPIDEMIA: ICD-10-CM

## 2024-05-20 RX ORDER — ATORVASTATIN CALCIUM 80 MG/1
80 TABLET, FILM COATED ORAL NIGHTLY
Qty: 90 TABLET | Refills: 2 | Status: SHIPPED | OUTPATIENT
Start: 2024-05-20

## 2024-06-06 ENCOUNTER — OFFICE VISIT (OUTPATIENT)
Dept: PRIMARY CARE | Facility: CLINIC | Age: 69
End: 2024-06-06
Payer: COMMERCIAL

## 2024-06-06 VITALS
HEART RATE: 71 BPM | SYSTOLIC BLOOD PRESSURE: 176 MMHG | DIASTOLIC BLOOD PRESSURE: 88 MMHG | WEIGHT: 140 LBS | BODY MASS INDEX: 23.3 KG/M2

## 2024-06-06 DIAGNOSIS — C64.2 RENAL CELL CARCINOMA, LEFT (MULTI): ICD-10-CM

## 2024-06-06 DIAGNOSIS — Z11.59 NEED FOR HEPATITIS C SCREENING TEST: ICD-10-CM

## 2024-06-06 DIAGNOSIS — I25.10 ARTERIOSCLEROSIS OF CORONARY ARTERY: ICD-10-CM

## 2024-06-06 DIAGNOSIS — Z23 NEED FOR PROPHYLACTIC VACCINATION AGAINST DIPHTHERIA AND TETANUS: ICD-10-CM

## 2024-06-06 DIAGNOSIS — I50.22 CHRONIC SYSTOLIC CONGESTIVE HEART FAILURE (MULTI): ICD-10-CM

## 2024-06-06 DIAGNOSIS — D50.9 IRON DEFICIENCY ANEMIA, UNSPECIFIED IRON DEFICIENCY ANEMIA TYPE: ICD-10-CM

## 2024-06-06 DIAGNOSIS — I10 BENIGN ESSENTIAL HYPERTENSION: ICD-10-CM

## 2024-06-06 DIAGNOSIS — N18.31 STAGE 3A CHRONIC KIDNEY DISEASE (MULTI): ICD-10-CM

## 2024-06-06 DIAGNOSIS — E78.5 DYSLIPIDEMIA: ICD-10-CM

## 2024-06-06 DIAGNOSIS — E79.0 HYPERURICEMIA: ICD-10-CM

## 2024-06-06 DIAGNOSIS — R73.01 IMPAIRED FASTING GLUCOSE: ICD-10-CM

## 2024-06-06 DIAGNOSIS — Z12.5 SCREENING FOR PROSTATE CANCER: ICD-10-CM

## 2024-06-06 DIAGNOSIS — Z23 NEED FOR VACCINATION: ICD-10-CM

## 2024-06-06 DIAGNOSIS — Z00.00 ROUTINE GENERAL MEDICAL EXAMINATION AT HEALTH CARE FACILITY: Primary | ICD-10-CM

## 2024-06-06 PROCEDURE — 90471 IMMUNIZATION ADMIN: CPT | Performed by: INTERNAL MEDICINE

## 2024-06-06 PROCEDURE — 3079F DIAST BP 80-89 MM HG: CPT | Performed by: INTERNAL MEDICINE

## 2024-06-06 PROCEDURE — 1123F ACP DISCUSS/DSCN MKR DOCD: CPT | Performed by: INTERNAL MEDICINE

## 2024-06-06 PROCEDURE — G0009 ADMIN PNEUMOCOCCAL VACCINE: HCPCS | Performed by: INTERNAL MEDICINE

## 2024-06-06 PROCEDURE — 1036F TOBACCO NON-USER: CPT | Performed by: INTERNAL MEDICINE

## 2024-06-06 PROCEDURE — 90677 PCV20 VACCINE IM: CPT | Performed by: INTERNAL MEDICINE

## 2024-06-06 PROCEDURE — 1160F RVW MEDS BY RX/DR IN RCRD: CPT | Performed by: INTERNAL MEDICINE

## 2024-06-06 PROCEDURE — 99397 PER PM REEVAL EST PAT 65+ YR: CPT | Performed by: INTERNAL MEDICINE

## 2024-06-06 PROCEDURE — 1170F FXNL STATUS ASSESSED: CPT | Performed by: INTERNAL MEDICINE

## 2024-06-06 PROCEDURE — 3077F SYST BP >= 140 MM HG: CPT | Performed by: INTERNAL MEDICINE

## 2024-06-06 PROCEDURE — 90715 TDAP VACCINE 7 YRS/> IM: CPT | Performed by: INTERNAL MEDICINE

## 2024-06-06 PROCEDURE — G0439 PPPS, SUBSEQ VISIT: HCPCS | Performed by: INTERNAL MEDICINE

## 2024-06-06 PROCEDURE — 1159F MED LIST DOCD IN RCRD: CPT | Performed by: INTERNAL MEDICINE

## 2024-06-06 ASSESSMENT — ACTIVITIES OF DAILY LIVING (ADL)
DRESSING: INDEPENDENT
MANAGING_FINANCES: INDEPENDENT
GROCERY_SHOPPING: INDEPENDENT
BATHING: INDEPENDENT
TAKING_MEDICATION: INDEPENDENT
DOING_HOUSEWORK: INDEPENDENT

## 2024-06-06 NOTE — PROGRESS NOTES
Subjective   Patient ID: Keaton Blanco is a 68 y.o. male who presents for Medicare Annual Wellness Visit Subsequent.      Reviewed all medications by prescribing practitioner or clinical pharmacist (such as prescriptions, OTCs, herbal therapies and supplements) and documented in the medical record.        Patient Care Team:  Gregg Schuster DO as PCP - General (Internal Medicine)  Gregg Schuster DO as PCP - [facility] [employer] PCP       HPI     Patient is a 68-year-old male with past medical history of renal cell carcinoma status post excision, chronic kidney disease stage III hyperglycemia dyslipidemia and systolic congestive heart failure presents for Medicare wellness.  No specific complaints at this time.  He is taking his medications as prescribed.  Blood pressure elevated but he states his blood pressure at home is consistently less than 130 systolic.  Previous blood pressures in the office have been on the higher end.  No headaches change in vision orthopnea.    He has chronic kidney disease with associated anemia he follows with urology for surveillance of the renal cell carcinoma.  He continues to take lisinopril and SGLT2.    Systolic congestive heart failure follows with cardiology.  He has better blood pressure control.  Continues Jardiance carvedilol spironolactone atorvastatin and aspirin.  No shortness of breath.    Review of Systems  Constitutional: No fever or chills, No Night Sweats  Eyes: No Blurry Vision or Eye sight problems  ENT: No Nasal Discharge, Hoarseness, sore throat  Cardiovascular: no chest pain, no palpitations and no syncope.   Respiratory: no cough, no shortness of breath during exertion and no shortness of breath at rest.   Gastrointestinal: no abdominal pain, no nausea and no vomiting.   : No issues with urinary stream, burning with urination, no blood in urine or stools  Skin: No Skin rashes or Lesions  Neuro: No Headache, no dizziness or Numbness or  tingling  Psych: No Anxiety, depression or sleeping problems  Heme: No Easy bleeding or brusing.     Objective   /88   Pulse 71   Wt 63.5 kg (140 lb)   BMI 23.30 kg/m²     Physical Exam  Constitutional: Alert and in no acute distress. Well developed, well nourished.   Head and Face: Head and face: Normal.    Eyes: Normal external exam. Pupils were equal in size, round, reactive to light (PERRL) with normal accommodation and extraocular movements intact (EOMI).   Ears, Nose, Mouth, and Throat: External inspection of ears and nose: Normal.  Hearing: Normal.  Nasal mucosa, septum, and turbinates: Normal.  Lips, teeth, and gums: Normal.  Oropharynx: Normal.   Neck: No neck mass was observed. Supple. Thyroid not enlarged and there were no palpable thyroid nodules.   Cardiovascular: Heart rate and rhythm were normal, normal S1 and S2. Pedal pulses: Normal. No peripheral edema.   Pulmonary: No respiratory distress. Clear bilateral breath sounds.   Abdomen: Soft nontender; no abdominal mass palpated. Normal bowel sounds. No organomegaly.   : Deferred  Musculoskeletal: No joint swelling seen, normal movements of all extremities. Range of motion: Normal.  Muscle strength/tone: Normal.    Skin: Normal skin color and pigmentation, normal skin turgor, and no rash.   Neurologic: Deep tendon reflexes were 2+ and symmetric.   Psychiatric: Judgment and insight: Intact. Mood and affect: Normal.  Lymphatic: No cervical lymphadenopathy. Palpation of lymph nodes in axillae: Normal.  Palpation of lymph nodes in groin: Normal.    Lab Results   Component Value Date    WBC 9.5 07/13/2023    HGB 9.6 (L) 07/13/2023    HCT 29.1 (L) 07/13/2023     (H) 07/13/2023    CHOL 131 03/08/2024    TRIG 41 03/08/2024    HDL 47.2 03/08/2024    ALT 19 07/17/2023    AST 24 07/17/2023     03/08/2024    K 5.6 (H) 03/08/2024     03/08/2024    CREATININE 2.37 (H) 03/08/2024    BUN 56 (H) 03/08/2024    CO2 21 03/08/2024    INR 1.1  06/21/2023    HGBA1C 6.0 (H) 03/08/2024       XR chest 2 views  Narrative: Interpreted By:  Schoenberger, Joseph,   STUDY:  XR CHEST 2 VIEWS;  2/1/2024 10:32 am      INDICATION:  Signs/Symptoms: RCC  C64.2: Renal cell carcinoma, left.      COMPARISON:  None.      ACCESSION NUMBER(S):  [id number]      ORDERING CLINICIAN:  ALEJANDRO FELIX      FINDINGS:                  CARDIOMEDIASTINAL SILHOUETTE:  Cardiomediastinal silhouette is normal in size and configuration.      LUNGS:  Lungs are clear.      ABDOMEN:  No remarkable upper abdominal findings.      BONES:  No acute osseous changes.      Impression: 1.  No evidence of acute cardiopulmonary process.              MACRO:  None      Signed by: Joseph Schoenberger 2/2/2024 2:52 PM  Dictation workstation:   [id number]  MR abdomen renal w and wo IV contrast  Narrative: Interpreted By:  Iam Carmichael,   STUDY:  MR ABDOMEN RENAL W AND WO IV CONTRAST; ;  2/1/2024 10:09 am      INDICATION:  Signs/Symptoms: RCC  C64.2: Renal cell carcinoma, left. Right renal  cell carcinoma status post right partial nephrectomy on 06/26/2023.      COMPARISON:  05/10/2023.      ACCESSION NUMBER(S):  [id number]      ORDERING CLINICIAN:  ALEJANDRO FELIX      TECHNIQUE:  Multisequence multiplanar imaging of the abdomen was performed with  attention to the kidneys. Sequences were obtained both without as  well as following intravenous administration of 12 mL Dotarem  contrast.      FINDINGS:  Some motion artifact is present.      Liver is not enlarged. No significant signal loss is seen throughout  the hepatic parenchyma on opposed phase sequences. Scattered  subcentimeter T2 hyperintense cysts throughout the liver are similar  to prior. No new focal hepatic lesion is seen.      Gallbladder is partially contracted containing small folds. No  definite gallstones. No intrahepatic or extrahepatic biliary dilation  is seen.      Spleen is not enlarged. No discrete splenic lesion.      Mild diffuse  prominence of the main pancreatic duct measuring 3 mm in  diameter is similar to prior. There is conventional pancreatic ductal  anatomy. No focal pancreatic lesion is seen.      Adrenal glands are unremarkable.      There has been interval partial right nephrectomy with resection of  the previously described right renal solid enhancing mass. Right  renal interpolar T2 hypointense scarring is now seen at the partial  nephrectomy site. Along the posterolateral margin of the right kidney  near the partial nephrectomy site there is a lobulated predominantly  T1 hypointense fluid collection which tracks from the upper pole to  the mid aspect of the right kidney. On T2 sequences this small  collection exhibits varying degrees of T2 hyperintensity. The more  focal T2 hyperintense component of this small collection at the mid  kidney level measures 2.3 x 2.1 cm in axial dimension. Following  contrast administration there is mild enhancement along the  peripheral margin of this collection without intrinsic nodular  enhancement. No definite residual enhancement is seen in the right  kidney at the partial nephrectomy site.      Multiple varying sized right renal simple cysts and complicated cysts  containing debris are again seen. No enhancing right renal lesion is  seen. No right hydronephrosis.      Multiple varying sized left renal simple cysts and complicated cysts  containing debris are again seen. No enhancing left renal lesion is  seen. No left hydronephrosis.      No abdominal or retroperitoneal lymphadenopathy is seen. No aortic  aneurysm. IVC is unremarkable.      Included portions of large and small bowel are not abnormally dilated.      Thoracolumbar mild dextrocurvature may be partially exaggerated by  positioning.      Impression: Interval partial right nephrectomy with resection of the previously  described right renal solid mass/neoplasm. No appreciable residual  enhancement of the right kidney at the partial  nephrectomy site.      New lobulated elongated right perinephric fluid collection extending  from the right renal pole to the mid aspect of the right kidney near  the partial nephrectomy site. Mild peripheral enhancement without  intrinsic nodular enhancement. This most likely represents a small  postoperative fluid collection or residual hematoma. Continued  surveillance recommended.      Both kidneys contain additional innumerable varying sized simple  cysts as well as complicated cysts containing debris. No enhancing  renal lesion is seen bilaterally. No hydronephrosis bilaterally.      No definite new evidence to suggest metastatic disease throughout the  abdomen.          MACRO:  None      Signed by: Iam Carmichael 2/2/2024 2:43 PM  Dictation workstation:   [id number]      Assessment/Plan   Problem List Items Addressed This Visit             ICD-10-CM    Renal cell carcinoma, left (Multi) C64.2     Continue following with urology for surveillance         Anemia D64.9     Likely related to CKD.  Check labs.  Advised iron 325 mg once daily         Arteriosclerosis of coronary artery I25.10     LDL at goal  Continue statin.  Advised Mediterranean diet and low-sodium diet         Benign essential hypertension I10     Uncontrolled.  Previous blood pressures have been elevated however he wishes to hold off considering his blood pressures at home have been normal.  Return to clinic 1 month for reevaluation         Chronic systolic congestive heart failure (Multi) I50.22     Continue following with cardiology.  Clinically compensated.         Dyslipidemia E78.5    Hyperuricemia E79.0     Continue continue allopurinol and check uric acid levels         Kidney disease, chronic, stage III (GFR 30-59 ml/min) (Multi) N18.30    Relevant Orders    Uric acid    Hepatitis C antibody    Renal function panel    Albumin , Urine Random     Other Visit Diagnoses         Codes    Routine general medical examination at [facility]  facility    -  Primary Z00.00    Relevant Orders    Follow Up In [facility] - PCP - Established    Impaired fasting glucose     R73.01    Need for prophylactic vaccination against diphtheria and tetanus     Z23    Relevant Orders    Tdap vaccine, age 7 years and older    Need for vaccination     Z23    Relevant Orders    Pneumococcal conjugate vaccine 20-valent IM    Screening for prostate cancer     Z12.5    Relevant Orders    Prostate Specific Antigen, Screen    Need for hepatitis C screening test     Z11.59    Relevant Orders    Hepatitis C antibody              Dear Keaton Blanco     It was my pleasure to take care of you today in the office. Below are the things we discussed today:    1. Immunizations: Yearly Flu shot is recommended.          a: COVID: Up-to-date         b: Tetanus: Up-to-date         c: Shingrix: Up-to-date         d: Pneumovax: Up-to-date         e: Prevnar: Up-to-date    2. Blood Work: Ordered  3. Seen your dentist twice a year  4. Yearly Eye exam is recommended    5. BMI: 23.3  6: Diet recommendations:   Eat Clean, Try to have as many home cooked meals as possible  Avoid processed foods which contain excess calories, sugar, and sodium.    7. Exercise recommendations:   150 minutes a week to maintain your weight     If you have to loose weight, you need a better diet and exercise plan.     8. Please get your Living will / Advance directive completed if you do not have one already. Please make sure our office has a copy of the latest one.     9. Colonoscopy: Up-to-date  10. PSA: Ordered    11.  Follow-up 1 month    Follow up in one year for a Physical. Please call the office before your Physical to see if you need blood work completed prior to your physical.     Please call me if any questions arise from now until your next visit. I will call you after I am done seeing patients. A Doctor is always available by phone when the office is closed. Please feel free to call for help  with any problem that you feel shouldn't wait until the office re-opens.     Gregg Schuster, DO

## 2024-06-06 NOTE — ASSESSMENT & PLAN NOTE
Uncontrolled.  Previous blood pressures have been elevated however he wishes to hold off considering his blood pressures at home have been normal.  Return to clinic 1 month for reevaluation

## 2024-06-10 ENCOUNTER — LAB (OUTPATIENT)
Dept: LAB | Facility: LAB | Age: 69
End: 2024-06-10
Payer: COMMERCIAL

## 2024-06-10 DIAGNOSIS — N18.31 STAGE 3A CHRONIC KIDNEY DISEASE (MULTI): ICD-10-CM

## 2024-06-10 DIAGNOSIS — Z12.5 SCREENING FOR PROSTATE CANCER: ICD-10-CM

## 2024-06-10 DIAGNOSIS — Z11.59 NEED FOR HEPATITIS C SCREENING TEST: ICD-10-CM

## 2024-06-10 LAB
ALBUMIN SERPL BCP-MCNC: 3.8 G/DL (ref 3.4–5)
ANION GAP SERPL CALC-SCNC: 15 MMOL/L (ref 10–20)
BUN SERPL-MCNC: 27 MG/DL (ref 6–23)
CALCIUM SERPL-MCNC: 9.4 MG/DL (ref 8.6–10.6)
CHLORIDE SERPL-SCNC: 105 MMOL/L (ref 98–107)
CO2 SERPL-SCNC: 25 MMOL/L (ref 21–32)
CREAT SERPL-MCNC: 1.58 MG/DL (ref 0.5–1.3)
CREAT UR-MCNC: 145.4 MG/DL (ref 20–370)
EGFRCR SERPLBLD CKD-EPI 2021: 48 ML/MIN/1.73M*2
GLUCOSE SERPL-MCNC: 100 MG/DL (ref 74–99)
HCV AB SER QL: NONREACTIVE
MICROALBUMIN UR-MCNC: 25.1 MG/L
MICROALBUMIN/CREAT UR: 17.3 UG/MG CREAT
PHOSPHATE SERPL-MCNC: 3.5 MG/DL (ref 2.5–4.9)
POTASSIUM SERPL-SCNC: 4.3 MMOL/L (ref 3.5–5.3)
PSA SERPL-MCNC: 1.8 NG/ML
SODIUM SERPL-SCNC: 141 MMOL/L (ref 136–145)
URATE SERPL-MCNC: 5.8 MG/DL (ref 4–7.5)

## 2024-06-10 PROCEDURE — 84550 ASSAY OF BLOOD/URIC ACID: CPT

## 2024-06-10 PROCEDURE — 82570 ASSAY OF URINE CREATININE: CPT

## 2024-06-10 PROCEDURE — G0103 PSA SCREENING: HCPCS

## 2024-06-10 PROCEDURE — 36415 COLL VENOUS BLD VENIPUNCTURE: CPT

## 2024-06-10 PROCEDURE — 86803 HEPATITIS C AB TEST: CPT

## 2024-06-10 PROCEDURE — 82043 UR ALBUMIN QUANTITATIVE: CPT

## 2024-06-10 PROCEDURE — 80069 RENAL FUNCTION PANEL: CPT

## 2024-07-08 ENCOUNTER — APPOINTMENT (OUTPATIENT)
Dept: PRIMARY CARE | Facility: CLINIC | Age: 69
End: 2024-07-08
Payer: COMMERCIAL

## 2024-07-08 ENCOUNTER — LAB (OUTPATIENT)
Dept: LAB | Facility: LAB | Age: 69
End: 2024-07-08
Payer: COMMERCIAL

## 2024-07-08 VITALS
SYSTOLIC BLOOD PRESSURE: 184 MMHG | OXYGEN SATURATION: 96 % | BODY MASS INDEX: 23.3 KG/M2 | WEIGHT: 140 LBS | HEART RATE: 73 BPM | DIASTOLIC BLOOD PRESSURE: 92 MMHG

## 2024-07-08 DIAGNOSIS — Z00.00 ROUTINE GENERAL MEDICAL EXAMINATION AT HEALTH CARE FACILITY: ICD-10-CM

## 2024-07-08 DIAGNOSIS — I50.22 CHRONIC SYSTOLIC CONGESTIVE HEART FAILURE (MULTI): ICD-10-CM

## 2024-07-08 DIAGNOSIS — I10 BENIGN ESSENTIAL HYPERTENSION: Primary | ICD-10-CM

## 2024-07-08 PROCEDURE — 1159F MED LIST DOCD IN RCRD: CPT | Performed by: INTERNAL MEDICINE

## 2024-07-08 PROCEDURE — 3077F SYST BP >= 140 MM HG: CPT | Performed by: INTERNAL MEDICINE

## 2024-07-08 PROCEDURE — 1036F TOBACCO NON-USER: CPT | Performed by: INTERNAL MEDICINE

## 2024-07-08 PROCEDURE — 99213 OFFICE O/P EST LOW 20 MIN: CPT | Performed by: INTERNAL MEDICINE

## 2024-07-08 PROCEDURE — 84132 ASSAY OF SERUM POTASSIUM: CPT

## 2024-07-08 PROCEDURE — 1123F ACP DISCUSS/DSCN MKR DOCD: CPT | Performed by: INTERNAL MEDICINE

## 2024-07-08 PROCEDURE — 36415 COLL VENOUS BLD VENIPUNCTURE: CPT

## 2024-07-08 PROCEDURE — 1160F RVW MEDS BY RX/DR IN RCRD: CPT | Performed by: INTERNAL MEDICINE

## 2024-07-08 PROCEDURE — 3080F DIAST BP >= 90 MM HG: CPT | Performed by: INTERNAL MEDICINE

## 2024-07-08 RX ORDER — LISINOPRIL 40 MG/1
40 TABLET ORAL DAILY
Qty: 90 TABLET | Refills: 3 | Status: SHIPPED | OUTPATIENT
Start: 2024-07-08

## 2024-07-08 NOTE — PROGRESS NOTES
Subjective   Patient ID: Keaton Blanco is a 68 y.o. male who presents for Follow-up.          HPI     Patient is a 68-year-old male with past medical history of renal cell carcinoma status post excision, chronic kidney disease stage III hyperglycemia dyslipidemia and systolic congestive heart failure presents for follow-up.      Blood pressure significantly elevated.  Has been taking his medications as prescribed.  Has an upcoming appointment to see the nephrologist.  He is on 3 high-dose medications.  No headache changes in vision orthopnea.  He does have a solitary kidney due to renal cell carcinoma    He has chronic kidney disease with associated anemia he follows with urology for surveillance of the renal cell carcinoma.  He continues to take lisinopril and SGLT2.    Systolic congestive heart failure follows with cardiology.  No recent weight gain.  No orthopnea or shortness of breath.  He tries to eat a low-salt diet.    Review of Systems  Constitutional: No fever or chills, No Night Sweats  Eyes: No Blurry Vision or Eye sight problems  ENT: No Nasal Discharge, Hoarseness, sore throat  Cardiovascular: no chest pain, no palpitations and no syncope.   Respiratory: no cough, no shortness of breath during exertion and no shortness of breath at rest.   Gastrointestinal: no abdominal pain, no nausea and no vomiting.   : No issues with urinary stream, burning with urination, no blood in urine or stools  Skin: No Skin rashes or Lesions  Neuro: No Headache, no dizziness or Numbness or tingling  Psych: No Anxiety, depression or sleeping problems  Heme: No Easy bleeding or brusing.     Objective   BP (!) 184/92   Pulse 73   Wt 63.5 kg (140 lb)   SpO2 96%   BMI 23.30 kg/m²     Physical Exam  Constitutional: Alert and in no acute distress. Well developed, well nourished.   Head and Face: Head and face: Normal.    Eyes: Normal external exam. Pupils were equal in size, round, reactive to light (PERRL) with normal  accommodation and extraocular movements intact (EOMI).   Ears, Nose, Mouth, and Throat: External inspection of ears and nose: Normal.  Hearing: Normal.  Nasal mucosa, septum, and turbinates: Normal.  Lips, teeth, and gums: Normal.  Oropharynx: Normal.   Neck: No neck mass was observed. Supple. Thyroid not enlarged and there were no palpable thyroid nodules.   Cardiovascular: Heart rate and rhythm were normal, normal S1 and S2. Pedal pulses: Normal. No peripheral edema.   Pulmonary: No respiratory distress. Clear bilateral breath sounds.   Abdomen: Soft nontender; no abdominal mass palpated. Normal bowel sounds. No organomegaly.       Lab Results   Component Value Date    WBC 9.5 07/13/2023    HGB 9.6 (L) 07/13/2023    HCT 29.1 (L) 07/13/2023     (H) 07/13/2023    CHOL 131 03/08/2024    TRIG 41 03/08/2024    HDL 47.2 03/08/2024    ALT 19 07/17/2023    AST 24 07/17/2023     06/10/2024    K 4.3 06/10/2024     06/10/2024    CREATININE 1.58 (H) 06/10/2024    BUN 27 (H) 06/10/2024    CO2 25 06/10/2024    INR 1.1 06/21/2023    HGBA1C 6.0 (H) 03/08/2024       XR chest 2 views  Narrative: Interpreted By:  Schoenberger, Joseph,   STUDY:  XR CHEST 2 VIEWS;  2/1/2024 10:32 am      INDICATION:  Signs/Symptoms: RCC  C64.2: Renal cell carcinoma, left.      COMPARISON:  None.      ACCESSION NUMBER(S):  [id number]      ORDERING CLINICIAN:  ALEJANDRO FELIX      FINDINGS:                  CARDIOMEDIASTINAL SILHOUETTE:  Cardiomediastinal silhouette is normal in size and configuration.      LUNGS:  Lungs are clear.      ABDOMEN:  No remarkable upper abdominal findings.      BONES:  No acute osseous changes.      Impression: 1.  No evidence of acute cardiopulmonary process.              MACRO:  None      Signed by: Joseph Schoenberger 2/2/2024 2:52 PM  Dictation workstation:   [id number]  MR abdomen renal w and wo IV contrast  Narrative: Interpreted By:  Iam Carmichael,   STUDY:  MR ABDOMEN RENAL W AND WO IV  CONTRAST; ;  2/1/2024 10:09 am      INDICATION:  Signs/Symptoms: RCC  C64.2: Renal cell carcinoma, left. Right renal  cell carcinoma status post right partial nephrectomy on 06/26/2023.      COMPARISON:  05/10/2023.      ACCESSION NUMBER(S):  [id number]      ORDERING CLINICIAN:  ALEJANDRO FELIX      TECHNIQUE:  Multisequence multiplanar imaging of the abdomen was performed with  attention to the kidneys. Sequences were obtained both without as  well as following intravenous administration of 12 mL Dotarem  contrast.      FINDINGS:  Some motion artifact is present.      Liver is not enlarged. No significant signal loss is seen throughout  the hepatic parenchyma on opposed phase sequences. Scattered  subcentimeter T2 hyperintense cysts throughout the liver are similar  to prior. No new focal hepatic lesion is seen.      Gallbladder is partially contracted containing small folds. No  definite gallstones. No intrahepatic or extrahepatic biliary dilation  is seen.      Spleen is not enlarged. No discrete splenic lesion.      Mild diffuse prominence of the main pancreatic duct measuring 3 mm in  diameter is similar to prior. There is conventional pancreatic ductal  anatomy. No focal pancreatic lesion is seen.      Adrenal glands are unremarkable.      There has been interval partial right nephrectomy with resection of  the previously described right renal solid enhancing mass. Right  renal interpolar T2 hypointense scarring is now seen at the partial  nephrectomy site. Along the posterolateral margin of the right kidney  near the partial nephrectomy site there is a lobulated predominantly  T1 hypointense fluid collection which tracks from the upper pole to  the mid aspect of the right kidney. On T2 sequences this small  collection exhibits varying degrees of T2 hyperintensity. The more  focal T2 hyperintense component of this small collection at the mid  kidney level measures 2.3 x 2.1 cm in axial dimension.  Following  contrast administration there is mild enhancement along the  peripheral margin of this collection without intrinsic nodular  enhancement. No definite residual enhancement is seen in the right  kidney at the partial nephrectomy site.      Multiple varying sized right renal simple cysts and complicated cysts  containing debris are again seen. No enhancing right renal lesion is  seen. No right hydronephrosis.      Multiple varying sized left renal simple cysts and complicated cysts  containing debris are again seen. No enhancing left renal lesion is  seen. No left hydronephrosis.      No abdominal or retroperitoneal lymphadenopathy is seen. No aortic  aneurysm. IVC is unremarkable.      Included portions of large and small bowel are not abnormally dilated.      Thoracolumbar mild dextrocurvature may be partially exaggerated by  positioning.      Impression: Interval partial right nephrectomy with resection of the previously  described right renal solid mass/neoplasm. No appreciable residual  enhancement of the right kidney at the partial nephrectomy site.      New lobulated elongated right perinephric fluid collection extending  from the right renal pole to the mid aspect of the right kidney near  the partial nephrectomy site. Mild peripheral enhancement without  intrinsic nodular enhancement. This most likely represents a small  postoperative fluid collection or residual hematoma. Continued  surveillance recommended.      Both kidneys contain additional innumerable varying sized simple  cysts as well as complicated cysts containing debris. No enhancing  renal lesion is seen bilaterally. No hydronephrosis bilaterally.      No definite new evidence to suggest metastatic disease throughout the  abdomen.          MACRO:  None      Signed by: Iam Carmichael 2/2/2024 2:43 PM  Dictation workstation:   [id number]      Assessment/Plan   Problem List Items Addressed This Visit             ICD-10-CM    Benign  essential hypertension - Primary I10     Hypertension uncontrolled.  Increase lisinopril to 40 mg once daily.  Now off spironolactone due to hyperkalemia.  Check potassium level.  Given significantly elevated blood pressure in the setting of 3 high-dose medications agree with seeing nephrology to discuss further.  Continue SGLT2.  Follow-up 1 month for blood pressure recheck.  Encouraged blood pressure monitoring at home and to bring documentation to clinic         Chronic systolic congestive heart failure (Multi) I50.22    Relevant Medications    lisinopril 40 mg tablet    Other Relevant Orders    Follow Up In [facility] - PCP - Established    Potassium     Other Visit Diagnoses         Codes    Routine general medical examination at health care facility     Z00.00    Relevant Orders    Potassium

## 2024-07-09 LAB — POTASSIUM SERPL-SCNC: 4.7 MMOL/L (ref 3.5–5.3)

## 2024-07-15 ENCOUNTER — OFFICE VISIT (OUTPATIENT)
Dept: NEPHROLOGY | Facility: HOSPITAL | Age: 69
End: 2024-07-15
Payer: COMMERCIAL

## 2024-07-15 VITALS
DIASTOLIC BLOOD PRESSURE: 84 MMHG | OXYGEN SATURATION: 99 % | RESPIRATION RATE: 20 BRPM | HEIGHT: 63 IN | TEMPERATURE: 97.3 F | BODY MASS INDEX: 23.91 KG/M2 | HEART RATE: 64 BPM | SYSTOLIC BLOOD PRESSURE: 176 MMHG | WEIGHT: 134.92 LBS

## 2024-07-15 DIAGNOSIS — N18.31 STAGE 3A CHRONIC KIDNEY DISEASE (MULTI): Primary | ICD-10-CM

## 2024-07-15 DIAGNOSIS — E08.22 DIABETES MELLITUS DUE TO UNDERLYING CONDITION WITH DIABETIC CHRONIC KIDNEY DISEASE, UNSPECIFIED CKD STAGE, UNSPECIFIED WHETHER LONG TERM INSULIN USE (MULTI): ICD-10-CM

## 2024-07-15 DIAGNOSIS — N18.4 CKD (CHRONIC KIDNEY DISEASE) STAGE 4, GFR 15-29 ML/MIN (MULTI): ICD-10-CM

## 2024-07-15 DIAGNOSIS — I10 ESSENTIAL HYPERTENSION: ICD-10-CM

## 2024-07-15 PROCEDURE — 3061F NEG MICROALBUMINURIA REV: CPT | Performed by: INTERNAL MEDICINE

## 2024-07-15 PROCEDURE — 4010F ACE/ARB THERAPY RXD/TAKEN: CPT | Performed by: INTERNAL MEDICINE

## 2024-07-15 PROCEDURE — 3079F DIAST BP 80-89 MM HG: CPT | Performed by: INTERNAL MEDICINE

## 2024-07-15 PROCEDURE — 3077F SYST BP >= 140 MM HG: CPT | Performed by: INTERNAL MEDICINE

## 2024-07-15 PROCEDURE — 1159F MED LIST DOCD IN RCRD: CPT | Performed by: INTERNAL MEDICINE

## 2024-07-15 PROCEDURE — 99214 OFFICE O/P EST MOD 30 MIN: CPT | Performed by: INTERNAL MEDICINE

## 2024-07-15 PROCEDURE — 1036F TOBACCO NON-USER: CPT | Performed by: INTERNAL MEDICINE

## 2024-07-15 PROCEDURE — 3044F HG A1C LEVEL LT 7.0%: CPT | Performed by: INTERNAL MEDICINE

## 2024-07-15 PROCEDURE — 3048F LDL-C <100 MG/DL: CPT | Performed by: INTERNAL MEDICINE

## 2024-07-15 PROCEDURE — 1123F ACP DISCUSS/DSCN MKR DOCD: CPT | Performed by: INTERNAL MEDICINE

## 2024-07-15 PROCEDURE — 1126F AMNT PAIN NOTED NONE PRSNT: CPT | Performed by: INTERNAL MEDICINE

## 2024-07-15 RX ORDER — CHLORTHALIDONE 25 MG/1
12.5 TABLET ORAL DAILY
Qty: 45 TABLET | Refills: 3 | Status: SHIPPED | OUTPATIENT
Start: 2024-07-15 | End: 2025-07-15

## 2024-07-15 ASSESSMENT — PAIN SCALES - GENERAL: PAINLEVEL: 0-NO PAIN

## 2024-07-15 NOTE — PROGRESS NOTES
Keaton PATTON Francis   68 y.o.    @@  [facility]/Room: [id number]/Room/bed info not found    Subjective:   The patient is being seen for a routine clinic follow-up of chronic kidney disease. Recently, the disease has been stable. Disease complications:  No hyperkalemia, no hypocalcemia, no hyperphosphatemia, no metabolic acidosis, no coagulopathy, no uremic encephalopathy, no neuropathy and no renal osteodystrophy. The patient is currently asymptomatic. No associated symptoms are reported.       Meds:   Current Outpatient Medications   Medication Sig Dispense Refill    allopurinol (Zyloprim) 100 mg tablet Take 1 tablet (100 mg) by mouth once daily. 30 tablet 11    amLODIPine (Norvasc) 10 mg tablet Take 1 tablet by mouth once daily 90 tablet 3    aspirin 81 mg EC tablet Take 1 tablet (81 mg) by mouth once daily.      atorvastatin (Lipitor) 80 mg tablet Take 1 tablet (80 mg) by mouth once daily at bedtime. 90 tablet 2    carvedilol (Coreg) 25 mg tablet Take 1 tablet by mouth twice daily 180 tablet 3    lisinopril 40 mg tablet Take 1 tablet (40 mg) by mouth once daily. for blood pressure 90 tablet 3    empagliflozin (Jardiance) 10 mg Take 1 tablet (10 mg) by mouth once daily.       No current facility-administered medications for this visit.          ROS:  The patient is awake and oriented. No dizziness or lightheadedness. No chills and no fever. No headaches. No nausea and no vomiting. No shortness of breath. No cough. No sputum. No chest pain. No chest tightness. No abdominal pain. No diarrhea and no constipation. No hematemesis or hemoptysis. No hematuria. No rectal bleeding. No melena. No epistaxis. No urinary symptoms. No flank pain. No leg edema. No leg pain. No weakness. No itching. Overall, the rest of the review of systems is also negative.  12 point review of systems otherwise negative as stated in HPI.        Physical Examination:        Vitals:    07/15/24 0816   BP: 176/84   Pulse: 64   Resp: 20   Temp: 36.3 °C (97.3  °F)   SpO2: 99%     General: The patient is awake, oriented, and is not in any distress.  Head and Neck: Normocephalic. No periorbital edema.  Eyes: non-icteric  Respiratory: Symmetric air entry. Symmetric chest expansion.No respiratory distress.  Cardiovascular: Symmetric peripheral pulses.  Skin: No maculopapular rash.  Abdomen: soft, nt/nd  Musculoskeletal: No peripheral edema in both left and right upper extremities.  No edema in either left or right lower extremities.  Neuro Exam: Speech is fluent. Moves extremities.    Imaging:  === 03/29/23 ===    US RENAL COMPLETE    - Impression -  4.2 x 4.4 x 5.0 cm right interpolar region solid hypoechoic mass with  increased vascularity. This may represent a renal neoplasm. Recommend  further evaluation with dedicated CT or MRI of the kidneys.  Multiple bilateral renal cysts, some with internal septations as  described above. These too may be evaluated with dedicated CT or MRI  of the kidneys.    Yellow Alert:  The critical information above was relayed directly by me by [employer]  system to CHARLI MATHIS on 4/6/2023 at 10:41 am.       Blood Labs:  No results found for this or any previous visit (from the past 24 hour(s)).   Lab Results   Component Value Date    PROTUR NEGATIVE 06/21/2023    PHOS 3.5 06/10/2024      Lab Results   Component Value Date    GLUCOSE 100 (H) 06/10/2024    CALCIUM 9.4 06/10/2024     06/10/2024    K 4.7 07/08/2024    CO2 25 06/10/2024     06/10/2024    BUN 27 (H) 06/10/2024    CREATININE 1.58 (H) 06/10/2024         Assessment and Plan:  67 year old Male  w/ Hx CKD3, T2DM, HTN,  renal mass s/p open right partial nephrectomy with Dr. Brown on 6/26/2023.     1- CKD III: Last Cr level is 1.5. No albuminuria.  Normal potassium and bicarb level.  I asked for PTH and 25-hydroxy vitamin D level.    2.  Hypertension.  Blood pressure is high.  He is on lisinopril.  I added chlorthalidone to his medications.    I will see him in about 6 months for  follow-up.          Ethan Malone MD  Senior Attending Physician  Director of Onco-Nephrology Program  Division of Nephrology & Hypertension  [facility]

## 2024-07-23 ENCOUNTER — OFFICE VISIT (OUTPATIENT)
Dept: CARDIOLOGY | Facility: HOSPITAL | Age: 69
End: 2024-07-23
Payer: COMMERCIAL

## 2024-07-23 VITALS
HEART RATE: 56 BPM | SYSTOLIC BLOOD PRESSURE: 166 MMHG | BODY MASS INDEX: 22.79 KG/M2 | DIASTOLIC BLOOD PRESSURE: 89 MMHG | WEIGHT: 136.8 LBS | HEIGHT: 65 IN | OXYGEN SATURATION: 98 %

## 2024-07-23 DIAGNOSIS — I50.22 CHRONIC SYSTOLIC CONGESTIVE HEART FAILURE (MULTI): ICD-10-CM

## 2024-07-23 DIAGNOSIS — E78.5 DYSLIPIDEMIA: ICD-10-CM

## 2024-07-23 DIAGNOSIS — I25.10 ARTERIOSCLEROSIS OF CORONARY ARTERY: Primary | ICD-10-CM

## 2024-07-23 DIAGNOSIS — I10 BENIGN ESSENTIAL HYPERTENSION: ICD-10-CM

## 2024-07-23 PROCEDURE — 1036F TOBACCO NON-USER: CPT | Performed by: INTERNAL MEDICINE

## 2024-07-23 PROCEDURE — 3008F BODY MASS INDEX DOCD: CPT | Performed by: INTERNAL MEDICINE

## 2024-07-23 PROCEDURE — 1159F MED LIST DOCD IN RCRD: CPT | Performed by: INTERNAL MEDICINE

## 2024-07-23 PROCEDURE — 99213 OFFICE O/P EST LOW 20 MIN: CPT | Performed by: INTERNAL MEDICINE

## 2024-07-23 PROCEDURE — 99213 OFFICE O/P EST LOW 20 MIN: CPT | Mod: 25 | Performed by: INTERNAL MEDICINE

## 2024-07-23 PROCEDURE — 93005 ELECTROCARDIOGRAM TRACING: CPT | Performed by: INTERNAL MEDICINE

## 2024-07-23 PROCEDURE — 93010 ELECTROCARDIOGRAM REPORT: CPT | Performed by: INTERNAL MEDICINE

## 2024-07-23 PROCEDURE — 1126F AMNT PAIN NOTED NONE PRSNT: CPT | Performed by: INTERNAL MEDICINE

## 2024-07-23 PROCEDURE — 3077F SYST BP >= 140 MM HG: CPT | Performed by: INTERNAL MEDICINE

## 2024-07-23 PROCEDURE — 3079F DIAST BP 80-89 MM HG: CPT | Performed by: INTERNAL MEDICINE

## 2024-07-23 PROCEDURE — 1160F RVW MEDS BY RX/DR IN RCRD: CPT | Performed by: INTERNAL MEDICINE

## 2024-07-23 PROCEDURE — 1123F ACP DISCUSS/DSCN MKR DOCD: CPT | Performed by: INTERNAL MEDICINE

## 2024-07-23 ASSESSMENT — PAIN SCALES - GENERAL: PAINLEVEL: 0-NO PAIN

## 2024-07-23 NOTE — PATIENT INSTRUCTIONS
You were seen in the [facility] for your history of high blood pressure and systolic heart failure.     Your echocardiogram in October 2021 showed that your heart's squeezing strength (ejection fraction) had improved to 40-45% from 25% in July 2021 with heart failure medication therapy. Your pumping strength is now mildly reduced and near the cut off of 50% and above that would be normal range.      We are treating your heart failure with the following medications:  1. Carvedilol 25 mg twice a day  2. Lisinopril 20 mg a day  3. Jardiance 10 mg a day     For blood pressure, you are also taking amlodipine 10 mg a day and Dr. Malone started chlorthalidone 12.5 mg a day.     Check your blood pressure every morning about 30-60 minutes after taking your morning blood pressure medications and keep a log book. Sit for 3-5 minutes in a chair to relax and place your arm on a table or counter to be level with your heart.      Eat a low salt diet (sodium limit of 8856-0002 mg a day) to help lower your blood pressure. Extra sodium causes an increase in the volume of blood inside your blood vessels and this increases your blood pressure.      Your October 2021 CT calcium score was high at 909. We are treating your heart artery atherosclerosis with aspirin 81 mg a day, atorvastatin 80 mg a day, and lifestyle modification program of heart healthy eat and aerobic exercise.     Eat a heart healthy diet. Limit portions of red meat. Eat fresh fruits and vegetables instead of processed carbohydrates. Olive oil (1 tablespoon a day), avocados, tree nuts as a source of omega-3 fat. Beans and legumes are good sources of plant based protein and fiber. The Mediterranean diet has been shown in clinical trials to reduce risk of heart disease by following these principles.      Aerobic exercise 30 minutes 3-5 times a week can help lower blood pressure and protect your heart.      Your March 2024 fasting cholesterol  blood work was at goal taking atorvastatin 80 mg a day.      Follow up with Dr. Hernandez in 6 months.

## 2024-07-23 NOTE — PROGRESS NOTES
Subjective   Keaton Blanco is a 68 y.o. male who presents to the [facility] for follow up of hypertensive heart disease with systolic heart failure. Last seen in January 2024.     Since our last visit, Mr. Blanco has no active cardiac symptoms of chest pain, dyspnea on exertion, PND, orthopnea, NOLBERTO, palpitations, syncope, or claudication. Now back to exercising c/w NYHA class I symptoms. Mr. Blanco walks his dog 2-3 miles and does moderate intensity exercise without dyspnea.      Has not had to take Lasix 40 mg tablets since I instructed him to take PRN after 9/11/2021 renal panel showed bump in Cr after starting spironolactone.    Elevated BP this spring and Dr. Schuster increased lisinopril to 40 mg a day. Dr. Malone added chlorthalidone 12.5 mg last week. He was able to start Jardiance since our last visit for cardiac and renal protect and his Cr is improved compared to 2023 lab work.    Admitted with hypertensive emergency and found to have newly diagnosed systolic heart failure (LV EF 25-30%) in July 2021. Had symptoms of exertional dyspnea, headache, fatigue prior to admission. Since discharge, home BP trend is now 120-130 mm / 70s mm Hg. Elevated BP today but patient states high anxiety at today's visit.      Past Medical History:  1. Chronic systolic heart failure: 7/28/2021 LV EF 25% -> 10/12/2021 LV EF 40-45%  2. Coronary arteriosclerosis: 10/13/2021 CT calcium score 909  3. Hypertension  4. Dyslipidemia  5. Impaired glucose tolerance (7/2021 A1c 6.0%; 3/2022 6.3%)  6. CKD stage 3 (Cr b/l 1.7)     Social History:  Never a smoker     Family History:  No premature CAD in 1st degree relatives ( 55 years of age for male relatives, 65 years of age for female relatives)     Review of Systems    A 14 point review of systems was asked. All questions were negative except for pertinent positives listed in the HPI.     Current Outpatient Medications on File Prior to Visit   Medication Sig  "Dispense Refill    allopurinol (Zyloprim) 100 mg tablet Take 1 tablet (100 mg) by mouth once daily. 30 tablet 11    amLODIPine (Norvasc) 10 mg tablet Take 1 tablet by mouth once daily 90 tablet 3    aspirin 81 mg EC tablet Take 1 tablet (81 mg) by mouth once daily.      atorvastatin (Lipitor) 80 mg tablet Take 1 tablet (80 mg) by mouth once daily at bedtime. 90 tablet 2    carvedilol (Coreg) 25 mg tablet Take 1 tablet by mouth twice daily 180 tablet 3    chlorthalidone (Hygroton) 25 mg tablet Take 0.5 tablets (12.5 mg) by mouth once daily. 45 tablet 3    empagliflozin (Jardiance) 10 mg Take 1 tablet (10 mg) by mouth once daily.      lisinopril 40 mg tablet Take 1 tablet (40 mg) by mouth once daily. for blood pressure 90 tablet 3     No current facility-administered medications on file prior to visit.     Objective   Physical Exam  BP Readings from Last 3 Encounters:   07/23/24 166/89   07/15/24 176/84   07/08/24 (!) 184/92      Wt Readings from Last 3 Encounters:   07/23/24 62.1 kg (136 lb 12.8 oz)   07/15/24 61.2 kg (134 lb 14.7 oz)   07/08/24 63.5 kg (140 lb)      BMI: Estimated body mass index is 22.76 kg/m² as calculated from the following:    Height as of this encounter: 1.651 m (5' 5\").    Weight as of this encounter: 62.1 kg (136 lb 12.8 oz).  BSA: Estimated body surface area is 1.69 meters squared as calculated from the following:    Height as of this encounter: 1.651 m (5' 5\").    Weight as of this encounter: 62.1 kg (136 lb 12.8 oz).    General: no acute distress  HEENT: EOMI, no scleral icterus.  Lungs: Clear to auscultation bilaterally without wheezing, rales, or rhonchi.  Cardiovascular: Regular rhythm and rate. Normal S1 and S2. No murmurs, rubs, or gallops are appreciated. JVP normal.  Abdomen: Soft, nontender, nondistended. Bowel sounds present.  Extremities: Warm and well perfused with equal 2+ pulses bilaterally.  No edema present.  Neurologic: Alert and oriented x3.    I have personally reviewed " "the following images and laboratory findings:  Last echocardiogram:  Most recent echo, 10/12/2021: LV EF 40-45%, borderline conc LVH (LVMI 113 gm/m2), impaired relaxation diastology (E/e' 12), upper limits of normal LA size (DEWAYNE 32 ml/m2), normal RV/RA, mild AI, trace TR, trace MR, no RVSP measured, mild Asc Ao enlargement (4.0 cm).     Prior echo, 2021: LV EF 25-30%, mod LVH (LVMI 153 gm/m2), mild LV dilation (LVEDD/ESD 5.2 / 4.3 cm), impaired relaxation diastology (E/e' 9.9), normal LA size (DEWAYNE 19 ml/m2), normal RV/RA, no AI, mild MR, mild TR, RVSP 21 mm Hg, Asc Ao 3.8 cm.     Last cath / stress test / CACS:  10/2021 CT calcium score: 909    Most recent EC2024 ECG: Sinus rhythm, 52 bpm, otherwise normal ECG. Personally reviewed in office.    2023 ECG: Sinus rhythm, 66 bpm, normal ECG. Personally reviewed in office.     Lab Results   Component Value Date    CHOL 131 2024    CHOL 142 2023    CHOL 123 2022     Lab Results   Component Value Date    HDL 47.2 2024    HDL 51.2 2023    HDL 45.6 2022     Lab Results   Component Value Date    LDLCALC 76 2024     Lab Results   Component Value Date    TRIG 41 2024    TRIG 59 2023    TRIG 46 2022     No components found for: \"CHOLHDL\"      Assessment/Plan   1. Chronic systolic heart failure:  2021 TTE: LV EF 25-30% -> 10/12/2021 echo 40-45%. Continue neurohormonal remodeling with carvedilol 25 mg twice a day, lisinopril 20 mg a day, and spironolactone 25 mg a day now. Lasix 40 mg PRN for 2 lb weight gain, has not used since our last visit in 2022. No indication for ICD now that LV EF > 35%.     Started on Jardiance 10 mg a day in 2024 with change in insurance coverage for chronic systolic heart failure.      2. Coronary arteriosclerosis / Dyslipidemia:  10/13/2021 CT calcium score high at 909. Taking aspirin 81 mg a day and high intensity statin (atorvastatin 80 mg a day). " LDL near goal < 70 on March 2024. Lifestyle modification program review of heart healthy diet and aerobic exercise.     3. Hypertension:  Continue current medications. Low sodium diet (0790-3141 mg sodium a day). Keep BP log book. BP running in 140s mm Hg now since starting chlorthalidone 12.5 mg a day. Continue current medications amlodipine 10 mg a day, lisinopril 40 mg a day, carvedilol 25 mg twice a day. .     Follow up with Dr. Hernandez in 6 months.            SIGNATURE: Sarath Hernandez MD PATIENT NAME: Keaton Blanco   DATE/TIME: July 23, 2024 2:42 PM MRN: [id number]

## 2024-07-24 LAB
ATRIAL RATE: 52 BPM
P AXIS: 58 DEGREES
P OFFSET: 188 MS
P ONSET: 123 MS
PR INTERVAL: 190 MS
Q ONSET: 218 MS
QRS COUNT: 9 BEATS
QRS DURATION: 104 MS
QT INTERVAL: 402 MS
QTC CALCULATION(BAZETT): 373 MS
QTC FREDERICIA: 383 MS
R AXIS: 80 DEGREES
T AXIS: 33 DEGREES
T OFFSET: 419 MS
VENTRICULAR RATE: 52 BPM

## 2024-08-05 ENCOUNTER — APPOINTMENT (OUTPATIENT)
Dept: PRIMARY CARE | Facility: CLINIC | Age: 69
End: 2024-08-05
Payer: COMMERCIAL

## 2024-08-05 VITALS
SYSTOLIC BLOOD PRESSURE: 134 MMHG | BODY MASS INDEX: 22.8 KG/M2 | DIASTOLIC BLOOD PRESSURE: 79 MMHG | OXYGEN SATURATION: 98 % | WEIGHT: 137 LBS | HEART RATE: 58 BPM

## 2024-08-05 DIAGNOSIS — I50.22 CHRONIC SYSTOLIC CONGESTIVE HEART FAILURE (MULTI): ICD-10-CM

## 2024-08-05 DIAGNOSIS — I10 BENIGN ESSENTIAL HYPERTENSION: Primary | ICD-10-CM

## 2024-08-05 PROCEDURE — 99213 OFFICE O/P EST LOW 20 MIN: CPT | Performed by: INTERNAL MEDICINE

## 2024-08-05 PROCEDURE — 3078F DIAST BP <80 MM HG: CPT | Performed by: INTERNAL MEDICINE

## 2024-08-05 PROCEDURE — 1123F ACP DISCUSS/DSCN MKR DOCD: CPT | Performed by: INTERNAL MEDICINE

## 2024-08-05 PROCEDURE — 1036F TOBACCO NON-USER: CPT | Performed by: INTERNAL MEDICINE

## 2024-08-05 PROCEDURE — 3075F SYST BP GE 130 - 139MM HG: CPT | Performed by: INTERNAL MEDICINE

## 2024-08-05 PROCEDURE — 1160F RVW MEDS BY RX/DR IN RCRD: CPT | Performed by: INTERNAL MEDICINE

## 2024-08-05 PROCEDURE — 1159F MED LIST DOCD IN RCRD: CPT | Performed by: INTERNAL MEDICINE

## 2024-08-05 NOTE — PROGRESS NOTES
Subjective   Patient ID: Keaton Blanco is a 68 y.o. male who presents for Follow-up.          HPI     Patient is a 68-year-old male with past medical history of renal cell carcinoma status post excision, chronic kidney disease stage III hyperglycemia dyslipidemia and systolic congestive heart failure presents for follow-up.      Blood pressure better controlled on current medications.  Denies headache changes in vision orthopnea  He does have a solitary kidney due to renal cell carcinoma    He has chronic kidney disease with associated anemia he follows with urology for surveillance of the renal cell carcinoma.  He continues to take lisinopril and SGLT2.    Systolic congestive heart failure follows with cardiology.  No recent weight gain.  No orthopnea or shortness of breath.  He tries to eat a low-salt diet.    Review of Systems  Constitutional: No fever or chills, No Night Sweats  Eyes: No Blurry Vision or Eye sight problems  ENT: No Nasal Discharge, Hoarseness, sore throat  Cardiovascular: no chest pain, no palpitations and no syncope.   Respiratory: no cough, no shortness of breath during exertion and no shortness of breath at rest.   Gastrointestinal: no abdominal pain, no nausea and no vomiting.   : No issues with urinary stream, burning with urination, no blood in urine or stools  Skin: No Skin rashes or Lesions  Neuro: No Headache, no dizziness or Numbness or tingling  Psych: No Anxiety, depression or sleeping problems  Heme: No Easy bleeding or brusing.     Objective   /79   Pulse 58   Wt 62.1 kg (137 lb)   SpO2 98%   BMI 22.80 kg/m²     Physical Exam  Constitutional: Alert and in no acute distress. Well developed, well nourished.   Head and Face: Head and face: Normal.    Eyes: Normal external exam. Pupils were equal in size, round, reactive to light (PERRL) with normal accommodation and extraocular movements intact (EOMI).   Ears, Nose, Mouth, and Throat: External inspection of ears and  nose: Normal.  Hearing: Normal.  Nasal mucosa, septum, and turbinates: Normal.  Lips, teeth, and gums: Normal.  Oropharynx: Normal.   Neck: No neck mass was observed. Supple. Thyroid not enlarged and there were no palpable thyroid nodules.   Cardiovascular: Heart rate and rhythm were normal, normal S1 and S2. Pedal pulses: Normal. No peripheral edema.   Pulmonary: No respiratory distress. Clear bilateral breath sounds.   Abdomen: Soft nontender; no abdominal mass palpated. Normal bowel sounds. No organomegaly.       Lab Results   Component Value Date    WBC 9.5 07/13/2023    HGB 9.6 (L) 07/13/2023    HCT 29.1 (L) 07/13/2023     (H) 07/13/2023    CHOL 131 03/08/2024    TRIG 41 03/08/2024    HDL 47.2 03/08/2024    ALT 19 07/17/2023    AST 24 07/17/2023     06/10/2024    K 4.7 07/08/2024     06/10/2024    CREATININE 1.58 (H) 06/10/2024    BUN 27 (H) 06/10/2024    CO2 25 06/10/2024    INR 1.1 06/21/2023    HGBA1C 6.0 (H) 03/08/2024       ECG 12 Lead  Sinus bradycardia  Voltage criteria for left ventricular hypertrophy  Abnormal ECG  When compared with ECG of 13-JUL-2023 13:41,  Previous ECG has undetermined rhythm, needs review  QRS axis Shifted left  QRS voltage has increased  Criteria for Lateral infarct are no longer Present  Nonspecific T wave abnormality, improved in Inferior leads  Nonspecific T wave abnormality no longer evident in Lateral leads  Confirmed by Sarath Hernandez ([id number]) on 7/24/2024 3:39:47 PM      Assessment/Plan   Problem List Items Addressed This Visit             ICD-10-CM    Benign essential hypertension - Primary I10    Relevant Orders    Follow Up In [facility] - PCP - Medicare Annual    Chronic systolic congestive heart failure (Multi) I50.22     Blood pressure not controlled on current medications.  No medication adjustments.  Follow-up 6 months for Medicare wellness.  Complete renal labs.

## 2024-12-17 ENCOUNTER — LAB (OUTPATIENT)
Dept: LAB | Facility: LAB | Age: 69
End: 2024-12-17
Payer: COMMERCIAL

## 2024-12-17 DIAGNOSIS — E08.22 DIABETES MELLITUS DUE TO UNDERLYING CONDITION WITH DIABETIC CHRONIC KIDNEY DISEASE, UNSPECIFIED CKD STAGE, UNSPECIFIED WHETHER LONG TERM INSULIN USE: ICD-10-CM

## 2024-12-17 DIAGNOSIS — I10 ESSENTIAL HYPERTENSION: ICD-10-CM

## 2024-12-17 DIAGNOSIS — N18.31 STAGE 3A CHRONIC KIDNEY DISEASE (MULTI): ICD-10-CM

## 2024-12-17 LAB
25(OH)D3 SERPL-MCNC: 34 NG/ML (ref 30–100)
ANION GAP SERPL CALC-SCNC: 14 MMOL/L (ref 10–20)
BUN SERPL-MCNC: 54 MG/DL (ref 6–23)
CALCIUM SERPL-MCNC: 9.8 MG/DL (ref 8.6–10.6)
CHLORIDE SERPL-SCNC: 105 MMOL/L (ref 98–107)
CO2 SERPL-SCNC: 25 MMOL/L (ref 21–32)
CREAT SERPL-MCNC: 2.42 MG/DL (ref 0.5–1.3)
CREAT UR-MCNC: 155.1 MG/DL (ref 20–370)
EGFRCR SERPLBLD CKD-EPI 2021: 29 ML/MIN/1.73M*2
GLUCOSE SERPL-MCNC: 105 MG/DL (ref 74–99)
POTASSIUM SERPL-SCNC: 4.3 MMOL/L (ref 3.5–5.3)
PROT UR-ACNC: 13 MG/DL (ref 5–25)
PROT/CREAT UR: 0.08 MG/MG CREAT (ref 0–0.17)
PTH-INTACT SERPL-MCNC: 54.2 PG/ML (ref 18.5–88)
SODIUM SERPL-SCNC: 140 MMOL/L (ref 136–145)

## 2024-12-17 PROCEDURE — 82306 VITAMIN D 25 HYDROXY: CPT

## 2024-12-17 PROCEDURE — 36415 COLL VENOUS BLD VENIPUNCTURE: CPT

## 2024-12-17 PROCEDURE — 84156 ASSAY OF PROTEIN URINE: CPT

## 2024-12-17 PROCEDURE — 80048 BASIC METABOLIC PNL TOTAL CA: CPT

## 2024-12-17 PROCEDURE — 83970 ASSAY OF PARATHORMONE: CPT

## 2024-12-17 PROCEDURE — 82570 ASSAY OF URINE CREATININE: CPT

## 2025-01-13 ENCOUNTER — OFFICE VISIT (OUTPATIENT)
Dept: NEPHROLOGY | Facility: HOSPITAL | Age: 70
End: 2025-01-13
Payer: COMMERCIAL

## 2025-01-13 VITALS
OXYGEN SATURATION: 100 % | SYSTOLIC BLOOD PRESSURE: 163 MMHG | BODY MASS INDEX: 23.96 KG/M2 | HEART RATE: 63 BPM | WEIGHT: 144 LBS | RESPIRATION RATE: 16 BRPM | TEMPERATURE: 96.4 F | DIASTOLIC BLOOD PRESSURE: 77 MMHG

## 2025-01-13 DIAGNOSIS — N18.31 STAGE 3A CHRONIC KIDNEY DISEASE (MULTI): ICD-10-CM

## 2025-01-13 DIAGNOSIS — I10 ESSENTIAL HYPERTENSION: Primary | ICD-10-CM

## 2025-01-13 PROCEDURE — 1123F ACP DISCUSS/DSCN MKR DOCD: CPT | Performed by: INTERNAL MEDICINE

## 2025-01-13 PROCEDURE — 3077F SYST BP >= 140 MM HG: CPT | Performed by: INTERNAL MEDICINE

## 2025-01-13 PROCEDURE — 99213 OFFICE O/P EST LOW 20 MIN: CPT | Performed by: INTERNAL MEDICINE

## 2025-01-13 PROCEDURE — 3078F DIAST BP <80 MM HG: CPT | Performed by: INTERNAL MEDICINE

## 2025-01-13 PROCEDURE — 1126F AMNT PAIN NOTED NONE PRSNT: CPT | Performed by: INTERNAL MEDICINE

## 2025-01-13 ASSESSMENT — PAIN SCALES - GENERAL: PAINLEVEL_OUTOF10: 0-NO PAIN

## 2025-01-13 NOTE — PROGRESS NOTES
Keaton Blanco   69 y.o.    @@  [facility]/Room: [id number]/Room/bed info not found    Subjective:   The patient is being seen for a routine clinic follow-up of chronic kidney disease. Recently, the disease has been stable. Disease complications:  No hyperkalemia, no hypocalcemia, no hyperphosphatemia, no metabolic acidosis, no coagulopathy, no uremic encephalopathy, no neuropathy and no renal osteodystrophy. The patient is currently asymptomatic. No associated symptoms are reported.       Meds:   Current Outpatient Medications   Medication Sig Dispense Refill    allopurinol (Zyloprim) 100 mg tablet Take 1 tablet (100 mg) by mouth once daily. 30 tablet 11    amLODIPine (Norvasc) 10 mg tablet Take 1 tablet by mouth once daily 90 tablet 3    aspirin 81 mg EC tablet Take 1 tablet (81 mg) by mouth once daily.      atorvastatin (Lipitor) 80 mg tablet Take 1 tablet (80 mg) by mouth once daily at bedtime. 90 tablet 2    carvedilol (Coreg) 25 mg tablet Take 1 tablet by mouth twice daily 180 tablet 3    chlorthalidone (Hygroton) 25 mg tablet Take 0.5 tablets (12.5 mg) by mouth once daily. 45 tablet 3    empagliflozin (Jardiance) 10 mg Take 1 tablet (10 mg) by mouth once daily.      lisinopril 40 mg tablet Take 1 tablet (40 mg) by mouth once daily. for blood pressure 90 tablet 3     No current facility-administered medications for this visit.          ROS:  The patient is awake and oriented. No dizziness or lightheadedness. No chills and no fever. No headaches. No nausea and no vomiting. No shortness of breath. No cough. No chest pain. No abdominal pain. No diarrhea. No hematemesis or hemoptysis. No hematuria. No rectal bleeding. No melena. No epistaxis. No urinary symptoms. No flank pain. No leg edema. No itching. Overall, the rest of the review of systems is also negative.  12 point review of systems otherwise negative as stated in HPI.        Physical Examination:        Vitals:    01/13/25 1138   BP: 163/77   Pulse: 63   Resp:  16   Temp: 35.8 °C (96.4 °F)   SpO2: 100%     General: The patient is awake, oriented, and is not in any distress.  Head and Neck: Normocephalic. No periorbital edema.  Eyes: non-icteric  Respiratory: Symmetric chest expansion. No respiratory distress.  Skin: No maculopapular rash.  Musculoskeletal: No peripheral edema.  Neuro Exam: Speech is fluent. Moves extremities.    Imaging:  === 03/29/23 ===    US RENAL COMPLETE    - Impression -  4.2 x 4.4 x 5.0 cm right interpolar region solid hypoechoic mass with  increased vascularity. This may represent a renal neoplasm. Recommend  further evaluation with dedicated CT or MRI of the kidneys.  Multiple bilateral renal cysts, some with internal septations as  described above. These too may be evaluated with dedicated CT or MRI  of the kidneys.    Yellow Alert:  The critical information above was relayed directly by me by [employer]  system to CHARLI MATHIS on 4/6/2023 at 10:41 am.       Blood Labs:  No results found for this or any previous visit (from the past 24 hours).   Lab Results   Component Value Date    PTH 54.2 12/17/2024    PROTUR NEGATIVE 06/21/2023    PHOS 3.5 06/10/2024      Lab Results   Component Value Date    GLUCOSE 105 (H) 12/17/2024    CALCIUM 9.8 12/17/2024     12/17/2024    K 4.3 12/17/2024    CO2 25 12/17/2024     12/17/2024    BUN 54 (H) 12/17/2024    CREATININE 2.42 (H) 12/17/2024         Assessment and Plan:  67 year old Male  w/ Hx CKD3, T2DM, HTN,  renal mass s/p open right partial nephrectomy with Dr. Brown on 6/26/2023.      1- CKD III: Last Cr level is 2.42. Normal potassium and bicarb level.  Cr level has been mostly around 2. I asked for PTH and 25-hydroxy vitamin D level.     2.  Hypertension.  Blood pressure is high.  He checks his BP regularly at home and his systolic blood pressure is always on 120s.  I asked him to bring his home blood pressure readings. We talked about low-salt diet.  Continue the current medications.       I  will see him in about 4 months for follow-up.             Ethan Malone MD  Senior Attending Physician  Director of Onco-Nephrology Program  Division of Nephrology & Hypertension  [facility]

## 2025-01-27 PROBLEM — N28.9 ABNORMAL KIDNEY FUNCTION: Status: ACTIVE | Noted: 2025-01-27

## 2025-01-28 ENCOUNTER — APPOINTMENT (OUTPATIENT)
Dept: CARDIOLOGY | Facility: HOSPITAL | Age: 70
End: 2025-01-28
Payer: COMMERCIAL

## 2025-01-28 VITALS
HEIGHT: 65 IN | OXYGEN SATURATION: 98 % | WEIGHT: 147 LBS | BODY MASS INDEX: 24.49 KG/M2 | DIASTOLIC BLOOD PRESSURE: 90 MMHG | SYSTOLIC BLOOD PRESSURE: 178 MMHG | HEART RATE: 68 BPM

## 2025-01-28 DIAGNOSIS — I25.10 ARTERIOSCLEROSIS OF CORONARY ARTERY: ICD-10-CM

## 2025-01-28 DIAGNOSIS — I10 BENIGN ESSENTIAL HYPERTENSION: ICD-10-CM

## 2025-01-28 DIAGNOSIS — E78.5 DYSLIPIDEMIA: ICD-10-CM

## 2025-01-28 DIAGNOSIS — I50.22 CHRONIC SYSTOLIC CONGESTIVE HEART FAILURE: Primary | ICD-10-CM

## 2025-01-28 PROCEDURE — 1123F ACP DISCUSS/DSCN MKR DOCD: CPT | Performed by: INTERNAL MEDICINE

## 2025-01-28 PROCEDURE — 3008F BODY MASS INDEX DOCD: CPT | Performed by: INTERNAL MEDICINE

## 2025-01-28 PROCEDURE — G2211 COMPLEX E/M VISIT ADD ON: HCPCS | Performed by: INTERNAL MEDICINE

## 2025-01-28 PROCEDURE — 3077F SYST BP >= 140 MM HG: CPT | Performed by: INTERNAL MEDICINE

## 2025-01-28 PROCEDURE — 1036F TOBACCO NON-USER: CPT | Performed by: INTERNAL MEDICINE

## 2025-01-28 PROCEDURE — 1159F MED LIST DOCD IN RCRD: CPT | Performed by: INTERNAL MEDICINE

## 2025-01-28 PROCEDURE — 99213 OFFICE O/P EST LOW 20 MIN: CPT | Performed by: INTERNAL MEDICINE

## 2025-01-28 PROCEDURE — 3080F DIAST BP >= 90 MM HG: CPT | Performed by: INTERNAL MEDICINE

## 2025-01-28 PROCEDURE — 1160F RVW MEDS BY RX/DR IN RCRD: CPT | Performed by: INTERNAL MEDICINE

## 2025-01-28 RX ORDER — LOSARTAN POTASSIUM 100 MG/1
100 TABLET ORAL DAILY
Qty: 30 TABLET | Refills: 11 | Status: SHIPPED | OUTPATIENT
Start: 2025-01-28 | End: 2026-01-28

## 2025-01-28 ASSESSMENT — COLUMBIA-SUICIDE SEVERITY RATING SCALE - C-SSRS
2. HAVE YOU ACTUALLY HAD ANY THOUGHTS OF KILLING YOURSELF?: NO
6. HAVE YOU EVER DONE ANYTHING, STARTED TO DO ANYTHING, OR PREPARED TO DO ANYTHING TO END YOUR LIFE?: NO
1. IN THE PAST MONTH, HAVE YOU WISHED YOU WERE DEAD OR WISHED YOU COULD GO TO SLEEP AND NOT WAKE UP?: NO

## 2025-01-28 ASSESSMENT — PATIENT HEALTH QUESTIONNAIRE - PHQ9
2. FEELING DOWN, DEPRESSED OR HOPELESS: NOT AT ALL
SUM OF ALL RESPONSES TO PHQ9 QUESTIONS 1 AND 2: 0
1. LITTLE INTEREST OR PLEASURE IN DOING THINGS: NOT AT ALL

## 2025-01-28 NOTE — PATIENT INSTRUCTIONS
You were seen in the [facility] for your history of high blood pressure and systolic heart failure.     Your echocardiogram in October 2021 showed that your heart's squeezing strength (ejection fraction) had improved to 40-45% from 25% in July 2021 with heart failure medication therapy. Your pumping strength is now mildly reduced and near the cut off of 50% and above that would be normal range.      We are treating your heart failure with the following medications:  1. Carvedilol 25 mg twice a day  2. Losartan 100 mg a day (REPLACES lisinopril 40 mg)  3. Jardiance 10 mg a day     For blood pressure, you are also taking amlodipine 10 mg a day and Dr. Malone started chlorthalidone 12.5 mg a day.     Check your blood pressure every morning about 30-60 minutes after taking your morning blood pressure medications and keep a log book. Sit for 3-5 minutes in a chair to relax and place your arm on a table or counter to be level with your heart.      Eat a low salt diet (sodium limit of 3660-5362 mg a day) to help lower your blood pressure. Extra sodium causes an increase in the volume of blood inside your blood vessels and this increases your blood pressure.      Your October 2021 CT calcium score was high at 909. We are treating your heart artery atherosclerosis with aspirin 81 mg a day, atorvastatin 80 mg a day, and lifestyle modification program of heart healthy eat and aerobic exercise.     Eat a heart healthy diet. Limit portions of red meat. Eat fresh fruits and vegetables instead of processed carbohydrates. Olive oil (1 tablespoon a day), avocados, tree nuts as a source of omega-3 fat. Beans and legumes are good sources of plant based protein and fiber. The Mediterranean diet has been shown in clinical trials to reduce risk of heart disease by following these principles.      Aerobic exercise 30 minutes 3-5 times a week can help lower blood pressure and protect your heart.      Your March  2024 fasting cholesterol blood work was at goal taking atorvastatin 80 mg a day.      Follow up with Dr. Hernandez in 6 months.

## 2025-01-28 NOTE — PROGRESS NOTES
Subjective   Keaton Blanco is a 69 y.o. male who presents to the [facility] [place] for follow up of hypertensive heart disease with systolic heart failure. Last seen in July 2024.     Since our last visit, Mr. Blanco has no active cardiac symptoms of chest pain, dyspnea on exertion, PND, orthopnea, NOLBERTO, palpitations, syncope, or claudication.     He continue routine exercise program with NYHA class I symptoms. Mr. Blanco walks his dog 2-3 miles and does moderate intensity exercise without dyspnea 3-4 times per week.      Has not had to take Lasix 40 mg tablets since last visit.    He was able to start Jardiance in 2024 for cardiac and renal protection.      Admitted with hypertensive emergency and found to have newly diagnosed systolic heart failure (LV EF 25-30%) in July 2021. Had symptoms of exertional dyspnea, headache, fatigue prior to admission. Since discharge, home BP trend is now 120-130 mm / 70s mm Hg. Elevated BP today but patient states high anxiety at today's visit.      Past Medical History:  1. Chronic systolic heart failure: 7/28/2021 LV EF 25% -> 10/12/2021 LV EF 40-45%  2. Coronary arteriosclerosis: 10/13/2021 CT calcium score 909  3. Hypertension  4. Dyslipidemia  5. Impaired glucose tolerance (7/2021 A1c 6.0%; 3/2022 6.3%)  6. CKD stage 3 (Cr b/l 1.7)     Social History:  Never a smoker     Family History:  No premature CAD in 1st degree relatives ( 55 years of age for male relatives, 65 years of age for female relatives)     Review of Systems    A 14 point review of systems was asked. All questions were negative except for pertinent positives listed in the HPI.     Current Outpatient Medications on File Prior to Visit   Medication Sig Dispense Refill    allopurinol (Zyloprim) 100 mg tablet Take 1 tablet (100 mg) by mouth once daily. 30 tablet 11    amLODIPine (Norvasc) 10 mg tablet Take 1 tablet by mouth once daily 90 tablet 3    aspirin 81 mg EC tablet Take 1 tablet (81  "mg) by mouth once daily.      atorvastatin (Lipitor) 80 mg tablet Take 1 tablet (80 mg) by mouth once daily at bedtime. 90 tablet 2    carvedilol (Coreg) 25 mg tablet Take 1 tablet by mouth twice daily 180 tablet 3    chlorthalidone (Hygroton) 25 mg tablet Take 0.5 tablets (12.5 mg) by mouth once daily. 45 tablet 3    empagliflozin (Jardiance) 10 mg Take 1 tablet (10 mg) by mouth once daily.      lisinopril 40 mg tablet Take 1 tablet (40 mg) by mouth once daily. for blood pressure 90 tablet 3     No current facility-administered medications on file prior to visit.     Objective   Physical Exam  BP Readings from Last 3 Encounters:   01/28/25 178/90   01/13/25 163/77   08/05/24 134/79      Wt Readings from Last 3 Encounters:   01/28/25 66.7 kg (147 lb)   01/13/25 65.3 kg (144 lb)   08/05/24 62.1 kg (137 lb)      BMI: Estimated body mass index is 24.46 kg/m² as calculated from the following:    Height as of this encounter: 1.651 m (5' 5\").    Weight as of this encounter: 66.7 kg (147 lb).  BSA: Estimated body surface area is 1.75 meters squared as calculated from the following:    Height as of this encounter: 1.651 m (5' 5\").    Weight as of this encounter: 66.7 kg (147 lb).    General: no acute distress  HEENT: EOMI, no scleral icterus.  Lungs: Clear to auscultation bilaterally without wheezing, rales, or rhonchi.  Cardiovascular: Regular rhythm and rate. Normal S1 and S2. No murmurs, rubs, or gallops are appreciated. JVP normal.  Abdomen: Soft, nontender, nondistended. Bowel sounds present.  Extremities: Warm and well perfused with equal 2+ pulses bilaterally.  No edema present.  Neurologic: Alert and oriented x3.    I have personally reviewed the following images and laboratory findings:  Last echocardiogram:  Most recent echo, 10/12/2021: LV EF 40-45%, borderline conc LVH (LVMI 113 gm/m2), impaired relaxation diastology (E/e' 12), upper limits of normal LA size (DEWAYNE 32 ml/m2), normal RV/RA, mild AI, trace TR, " "trace MR, no RVSP measured, mild Asc Ao enlargement (4.0 cm).     Prior echo, 2021: LV EF 25-30%, mod LVH (LVMI 153 gm/m2), mild LV dilation (LVEDD/ESD 5.2 / 4.3 cm), impaired relaxation diastology (E/e' 9.9), normal LA size (DEWAYNE 19 ml/m2), normal RV/RA, no AI, mild MR, mild TR, RVSP 21 mm Hg, Asc Ao 3.8 cm.     Last cath / stress test / CACS:  10/2021 CT calcium score: 909    Most recent EC2024 ECG: Sinus rhythm, 52 bpm, otherwise normal ECG. Personally reviewed in office.    2023 ECG: Sinus rhythm, 66 bpm, normal ECG. Personally reviewed in office.     Lab Results   Component Value Date    CHOL 131 2024    CHOL 142 2023    CHOL 123 2022     Lab Results   Component Value Date    HDL 47.2 2024    HDL 51.2 2023    HDL 45.6 2022     Lab Results   Component Value Date    LDLCALC 76 2024     Lab Results   Component Value Date    TRIG 41 2024    TRIG 59 2023    TRIG 46 2022     No components found for: \"CHOLHDL\"      Assessment/Plan   1. Chronic systolic heart failure:  2021 TTE: LV EF 25-30% -> 10/12/2021 echo 40-45%. Continue neurohormonal remodeling with carvedilol 25 mg twice a day, and empagliflozin 10 mg a day. Replace ACEi with losartan 100 mg a day. Spironolactone stopped for CKD and hyperkalemia.    Lasix 40 mg PRN for 2 lb weight gain, has not used since our last visit in 2022. No indication for ICD now that LV EF > 35%.     2. Coronary arteriosclerosis / Dyslipidemia:  10/13/2021 CT calcium score high at 909. Taking aspirin 81 mg a day and high intensity statin (atorvastatin 80 mg a day). LDL near goal < 70 on 2024. Lifestyle modification program review of heart healthy diet and aerobic exercise.     3. Hypertension:  Above goal 120-130 mm Hg range at home with 130s-140s mm Hg. Stop lisinopril 40 mg a day and start losartan 100 mg a day. Continue chlorthalidone 12.5 mg a day, amlodipine 10 mg a day, carvedilol 25 " mg twice a day.. Low sodium diet (8865-3254 mg sodium a day).      Follow up with Dr. Hernandez in 6 months.            SIGNATURE: Sarath Hernandez MD PATIENT NAME: Keaton Blanco   DATE/TIME: January 28, 2025 2:56 PM MRN: [id number]

## 2025-02-03 ENCOUNTER — APPOINTMENT (OUTPATIENT)
Dept: PRIMARY CARE | Facility: CLINIC | Age: 70
End: 2025-02-03
Payer: COMMERCIAL

## 2025-02-04 ENCOUNTER — APPOINTMENT (OUTPATIENT)
Dept: UROLOGY | Facility: HOSPITAL | Age: 70
End: 2025-02-04
Payer: COMMERCIAL

## 2025-02-05 ENCOUNTER — HOSPITAL ENCOUNTER (OUTPATIENT)
Dept: RADIOLOGY | Facility: HOSPITAL | Age: 70
Discharge: HOME | End: 2025-02-05
Payer: COMMERCIAL

## 2025-02-05 DIAGNOSIS — C64.2 RENAL CELL CARCINOMA, LEFT (MULTI): ICD-10-CM

## 2025-02-05 PROCEDURE — 74181 MRI ABDOMEN W/O CONTRAST: CPT

## 2025-02-05 PROCEDURE — 74181 MRI ABDOMEN W/O CONTRAST: CPT | Performed by: RADIOLOGY

## 2025-02-06 ENCOUNTER — APPOINTMENT (OUTPATIENT)
Dept: PRIMARY CARE | Facility: CLINIC | Age: 70
End: 2025-02-06
Payer: COMMERCIAL

## 2025-02-06 VITALS
HEART RATE: 70 BPM | SYSTOLIC BLOOD PRESSURE: 143 MMHG | BODY MASS INDEX: 24.13 KG/M2 | WEIGHT: 145 LBS | OXYGEN SATURATION: 97 % | DIASTOLIC BLOOD PRESSURE: 73 MMHG

## 2025-02-06 DIAGNOSIS — I10 BENIGN ESSENTIAL HYPERTENSION: ICD-10-CM

## 2025-02-06 DIAGNOSIS — C64.2 RENAL CELL CARCINOMA, LEFT (MULTI): ICD-10-CM

## 2025-02-06 DIAGNOSIS — R73.9 HYPERGLYCEMIA: ICD-10-CM

## 2025-02-06 DIAGNOSIS — N18.31 STAGE 3A CHRONIC KIDNEY DISEASE (MULTI): Primary | ICD-10-CM

## 2025-02-06 PROCEDURE — G2211 COMPLEX E/M VISIT ADD ON: HCPCS | Performed by: INTERNAL MEDICINE

## 2025-02-06 PROCEDURE — 3077F SYST BP >= 140 MM HG: CPT | Performed by: INTERNAL MEDICINE

## 2025-02-06 PROCEDURE — 3078F DIAST BP <80 MM HG: CPT | Performed by: INTERNAL MEDICINE

## 2025-02-06 PROCEDURE — 1123F ACP DISCUSS/DSCN MKR DOCD: CPT | Performed by: INTERNAL MEDICINE

## 2025-02-06 PROCEDURE — 99213 OFFICE O/P EST LOW 20 MIN: CPT | Performed by: INTERNAL MEDICINE

## 2025-02-06 PROCEDURE — 1036F TOBACCO NON-USER: CPT | Performed by: INTERNAL MEDICINE

## 2025-02-06 PROCEDURE — 1160F RVW MEDS BY RX/DR IN RCRD: CPT | Performed by: INTERNAL MEDICINE

## 2025-02-06 PROCEDURE — 1159F MED LIST DOCD IN RCRD: CPT | Performed by: INTERNAL MEDICINE

## 2025-02-06 ASSESSMENT — PATIENT HEALTH QUESTIONNAIRE - PHQ9
2. FEELING DOWN, DEPRESSED OR HOPELESS: NOT AT ALL
1. LITTLE INTEREST OR PLEASURE IN DOING THINGS: NOT AT ALL
SUM OF ALL RESPONSES TO PHQ9 QUESTIONS 1 AND 2: 0

## 2025-02-06 NOTE — PROGRESS NOTES
Subjective   Patient ID: Keaton Blanco is a 69 y.o. male who presents for Follow-up.          HPI     Patient is a 69-year-old male with past medical history of renal cell carcinoma status post excision, chronic kidney disease stage III hyperglycemia dyslipidemia and systolic congestive heart failure presents for follow-up.      Blood pressure better controlled on current medications.  Denies headache changes in vision orthopnea  He does have a solitary kidney due to renal cell carcinoma    He has chronic kidney disease with associated anemia he follows with urology for surveillance of the renal cell carcinoma.  He continues to take lisinopril and SGLT2.    Systolic congestive heart failure follows with cardiology.  No recent weight gain.  No orthopnea or shortness of breath.  He tries to eat a low-salt diet.    Review of Systems  Constitutional: No fever or chills, No Night Sweats  Eyes: No Blurry Vision or Eye sight problems  ENT: No Nasal Discharge, Hoarseness, sore throat  Cardiovascular: no chest pain, no palpitations and no syncope.   Respiratory: no cough, no shortness of breath during exertion and no shortness of breath at rest.   Gastrointestinal: no abdominal pain, no nausea and no vomiting.   : No issues with urinary stream, burning with urination, no blood in urine or stools  Skin: No Skin rashes or Lesions  Neuro: No Headache, no dizziness or Numbness or tingling  Psych: No Anxiety, depression or sleeping problems  Heme: No Easy bleeding or brusing.     Objective   /73   Pulse 70   Wt 65.8 kg (145 lb)   SpO2 97%   BMI 24.13 kg/m²     Physical Exam  Constitutional: Alert and in no acute distress. Well developed, well nourished.   Head and Face: Head and face: Normal.    Eyes: Normal external exam. Pupils were equal in size, round, reactive to light (PERRL) with normal accommodation and extraocular movements intact (EOMI).   Ears, Nose, Mouth, and Throat: External inspection of ears and  nose: Normal.  Hearing: Normal.  Nasal mucosa, septum, and turbinates: Normal.  Lips, teeth, and gums: Normal.  Oropharynx: Normal.   Neck: No neck mass was observed. Supple. Thyroid not enlarged and there were no palpable thyroid nodules.   Cardiovascular: Heart rate and rhythm were normal, normal S1 and S2. Pedal pulses: Normal. No peripheral edema.   Pulmonary: No respiratory distress. Clear bilateral breath sounds.   Abdomen: Soft nontender; no abdominal mass palpated. Normal bowel sounds. No organomegaly.       Lab Results   Component Value Date    WBC 9.5 07/13/2023    HGB 9.6 (L) 07/13/2023    HCT 29.1 (L) 07/13/2023     (H) 07/13/2023    CHOL 131 03/08/2024    TRIG 41 03/08/2024    HDL 47.2 03/08/2024    ALT 19 07/17/2023    AST 24 07/17/2023     12/17/2024    K 4.3 12/17/2024     12/17/2024    CREATININE 2.42 (H) 12/17/2024    BUN 54 (H) 12/17/2024    CO2 25 12/17/2024    INR 1.1 06/21/2023    HGBA1C 6.0 (H) 03/08/2024       MR kidney wo IV contrast  Narrative: Interpreted By:  Jovani Menard,   STUDY:  MR KIDNEY WO IV CONTRAST;  2/5/2025 2:27 pm      INDICATION:  Signs/Symptoms:LEFT RCC.      ,C64.2 Malignant neoplasm of left kidney, except renal pelvis (Multi)      COMPARISON:  MRI 1 February 2024      ACCESSION NUMBER(S):  [id number]      ORDERING CLINICIAN:  ALEJANDRO FELIX      TECHNIQUE:  Multi-planar, multi-pulse sequence MRI abdomen without intravenous  contrast      FINDINGS:  RIGHT KIDNEY AND INCLUDED URETER:      No hydronephrosis      Too numerous to count, measure and list cysts, mostly simple, some  not simple water signal intensity composition but cannot be evaluated  any further without IV contrast. None have dramatically changed in  size or morphology from prior exam      There is no longer any simple water signal intensity within the now  no greater than 19 x 17 mm abnormality at the partial nephrectomy  site but the composition of the residual abnormality cannot  be  further assessed without contrast      LEFT KIDNEY AND INCLUDED URETER:      No hydronephrosis      Too numerous to count, measure and list cysts, mostly simple, some  not simple water signal intensity composition but cannot be evaluated  any further without IV contrast. None have dramatically changed in  size or morphology from prior exam      RIGHT ADRENAL GLAND: No mass or hyperplasia.      LEFT ADRENAL GLAND: No mass or hyperplasia.      LIVER:  Size: Within normal limits  Cirrhotic change: Negative  Fatty change: Negative  Significant iron deposition: Negative  Solid mass: No gross evidence of solid mass.  Other: Unchanged tiny simple cysts      GALLBLADDER:  Stone/s: Negative  Polyp: Negative  Dilation: Negative  Wall thickening: Negative  Other: n/a      BILE DUCTS:  Intrahepatic dilation: Negative  Extrahepatic dilation: Negative  Stricture: Negative  Stone/s: Negative  Other: n/a      PANCREAS:  Acute inflammatory change: Negative  Morphologic changes of chronic pancreatitis: Negative  Peripancreatic collection: Negative  Main duct dilation: Negative  Pancreas divisum: Negative  Solid mass: No obvious solid mass, noting sensitivity and specificity  are both limited without IV contrast Cyst / cystic lesion: Negative      SPLEEN: The spleen is normal in size, without focal lesion.      LYMPH NODES: No abdominal adenopathy, intraperitoneal,  retroperitoneal or otherwise.      INCLUDED BOWEL: Normal in caliber and wall thickness.      RETROPERITONEUM: Normal.  No hemorrhage or inflammatory change.  (lymph nodes in dedicated section)      OMENTUM, MESENTERY AND PERITONEAL SPACES: No fluid collection or free  fluid.  Grossly normal omentum and mesentery.  (lymph nodes in  dedicated section)      VASCULATURE: No abdominal aortic aneurysm.      LOWER CHEST: No pleural effusion.      BONES: Normal marrow signal intensity in the included skeleton.      Impression: MRI kidney 1 February 2024 noted perinephric  fluid collection with  some associated enhancement after the partial right nephrectomy      Today's exam was performed as ordered, without contrast and as such  cannot evaluate for any enhancing abnormalities or triage any of the  bilateral renal cysts that are not completely simple cysts as  potentially neoplastic or not without IV contrast      The abnormality at the right partial nephrectomy site previously  measured at least 21 x 23 mm; today, no greater than 19 x 17 mm. It  no longer has any simple water signal intensity, but the composition  is otherwise uncertain on today's exam without IV contrast, could  include residual viable neoplasm or may simply be scar tissue      MACRO:  None      Signed by: Jovani Menard 2/6/2025 9:40 AM  Dictation workstation:   [id number]      Assessment/Plan   Problem List Items Addressed This Visit             ICD-10-CM    Renal cell carcinoma, left (Multi) C64.2     Continue following with urology for surveillance         Benign essential hypertension I10     Hypertension uncontrolled.  Adjustments in meds completed recently.  Will recheck in 6 months          Hyperglycemia R73.9    Relevant Orders    Hemoglobin A1C    Follow Up In [facility] - PCP - Medicare Annual    Kidney disease, chronic, stage III (GFR 30-59 ml/min) (Multi) - Primary N18.30    Relevant Orders    Albumin-Creatinine Ratio, Urine Random    Hemoglobin A1C    Follow Up In [facility] - PCP - Medicare Annual

## 2025-02-11 ENCOUNTER — OFFICE VISIT (OUTPATIENT)
Dept: UROLOGY | Facility: HOSPITAL | Age: 70
End: 2025-02-11
Payer: COMMERCIAL

## 2025-02-11 VITALS
DIASTOLIC BLOOD PRESSURE: 74 MMHG | TEMPERATURE: 98.1 F | RESPIRATION RATE: 16 BRPM | WEIGHT: 143.96 LBS | SYSTOLIC BLOOD PRESSURE: 156 MMHG | HEART RATE: 62 BPM | BODY MASS INDEX: 23.96 KG/M2 | OXYGEN SATURATION: 100 %

## 2025-02-11 DIAGNOSIS — C64.2 RENAL CELL CARCINOMA, LEFT (MULTI): Primary | ICD-10-CM

## 2025-02-11 PROCEDURE — 99213 OFFICE O/P EST LOW 20 MIN: CPT | Performed by: STUDENT IN AN ORGANIZED HEALTH CARE EDUCATION/TRAINING PROGRAM

## 2025-02-11 PROCEDURE — G2211 COMPLEX E/M VISIT ADD ON: HCPCS | Performed by: STUDENT IN AN ORGANIZED HEALTH CARE EDUCATION/TRAINING PROGRAM

## 2025-02-11 PROCEDURE — 1159F MED LIST DOCD IN RCRD: CPT | Performed by: STUDENT IN AN ORGANIZED HEALTH CARE EDUCATION/TRAINING PROGRAM

## 2025-02-11 PROCEDURE — 3077F SYST BP >= 140 MM HG: CPT | Performed by: STUDENT IN AN ORGANIZED HEALTH CARE EDUCATION/TRAINING PROGRAM

## 2025-02-11 PROCEDURE — 1123F ACP DISCUSS/DSCN MKR DOCD: CPT | Performed by: STUDENT IN AN ORGANIZED HEALTH CARE EDUCATION/TRAINING PROGRAM

## 2025-02-11 PROCEDURE — 1126F AMNT PAIN NOTED NONE PRSNT: CPT | Performed by: STUDENT IN AN ORGANIZED HEALTH CARE EDUCATION/TRAINING PROGRAM

## 2025-02-11 PROCEDURE — 1036F TOBACCO NON-USER: CPT | Performed by: STUDENT IN AN ORGANIZED HEALTH CARE EDUCATION/TRAINING PROGRAM

## 2025-02-11 PROCEDURE — 3078F DIAST BP <80 MM HG: CPT | Performed by: STUDENT IN AN ORGANIZED HEALTH CARE EDUCATION/TRAINING PROGRAM

## 2025-02-11 ASSESSMENT — PAIN SCALES - GENERAL: PAINLEVEL_OUTOF10: 0-NO PAIN

## 2025-02-11 NOTE — ASSESSMENT & PLAN NOTE
69 y.o. 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    We reviewed and discussed his imaging and labs in detail. MRI kidney done 02/2025 showed DEONTE. His eGFR is 29 and is creatinine is 2.42.    PLAN:  Continue surveillance yearly with kidney MRI and BMP.

## 2025-02-11 NOTE — PROGRESS NOTES
Subjective    Keaton Blanco is a 69 y.o. 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 This was not staged because these tumors do not get staged.      He reports feeling well. He has no new concerns.     MRI kidney done 02/2025 showed DEONTE.    PSA in 06/2024 is 1.80.      His eGFR is 29 and is creatinine is 2.42.      Review of Systems    All systems were reviewed. Anything negative was noted in the HPI.    Objective   Physical Exam  Gen: No acute distress      Psych: Alert and oriented x3      Neuro:  Normal ROM     Resp: Nonlabored respirations      CV: Regular rate and rhythm      Abd: S, NT, ND.     : Deferred     Skin: Warm, dry and intact without rashes      Lymphatics: No peripheral edema           No past medical history on file.      No past surgical history on file.      Assessment & Plan  Renal cell carcinoma, left (Multi)  69 y.o. 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    We reviewed and discussed his imaging and labs in detail. MRI kidney done 02/2025 showed DEONTE. His eGFR is 29 and is creatinine is 2.42.    PLAN:  Continue surveillance yearly with kidney MRI and BMP.                                    Scribe Attestation  By signing my name below, I, Qasim Anthony   attest that this documentation has been prepared under the direction and in the presence of Marito Brown MD MPH

## 2025-02-14 DIAGNOSIS — E78.5 DYSLIPIDEMIA: ICD-10-CM

## 2025-02-17 RX ORDER — ATORVASTATIN CALCIUM 80 MG/1
80 TABLET, FILM COATED ORAL NIGHTLY
Qty: 90 TABLET | Refills: 0 | Status: SHIPPED | OUTPATIENT
Start: 2025-02-17

## 2025-02-18 DIAGNOSIS — I25.10 ARTERIOSCLEROSIS OF CORONARY ARTERY: ICD-10-CM

## 2025-02-18 DIAGNOSIS — I10 BENIGN ESSENTIAL HYPERTENSION: ICD-10-CM

## 2025-02-18 RX ORDER — AMLODIPINE BESYLATE 10 MG/1
10 TABLET ORAL DAILY
Qty: 90 TABLET | Refills: 3 | Status: SHIPPED | OUTPATIENT
Start: 2025-02-18

## 2025-02-18 RX ORDER — CARVEDILOL 25 MG/1
25 TABLET ORAL 2 TIMES DAILY
Qty: 180 TABLET | Refills: 3 | Status: SHIPPED | OUTPATIENT
Start: 2025-02-18

## 2025-03-17 DIAGNOSIS — E79.0 ELEVATED URIC ACID IN BLOOD: ICD-10-CM

## 2025-03-17 RX ORDER — ALLOPURINOL 100 MG/1
100 TABLET ORAL DAILY
Qty: 30 TABLET | Refills: 3 | Status: SHIPPED | OUTPATIENT
Start: 2025-03-17

## 2025-05-06 LAB
ALBUMIN/CREAT UR: 17 MG/G CREAT
CREAT UR-MCNC: 114 MG/DL (ref 20–320)
EST. AVERAGE GLUCOSE BLD GHB EST-MCNC: 126 MG/DL
EST. AVERAGE GLUCOSE BLD GHB EST-SCNC: 7 MMOL/L
HBA1C MFR BLD: 6 %
MICROALBUMIN UR-MCNC: 1.9 MG/DL

## 2025-05-12 ENCOUNTER — OFFICE VISIT (OUTPATIENT)
Dept: NEPHROLOGY | Facility: HOSPITAL | Age: 70
End: 2025-05-12
Payer: COMMERCIAL

## 2025-05-12 VITALS
WEIGHT: 147 LBS | TEMPERATURE: 97 F | SYSTOLIC BLOOD PRESSURE: 174 MMHG | BODY MASS INDEX: 24.46 KG/M2 | HEART RATE: 63 BPM | DIASTOLIC BLOOD PRESSURE: 91 MMHG | OXYGEN SATURATION: 100 %

## 2025-05-12 DIAGNOSIS — I10 ESSENTIAL HYPERTENSION: Primary | ICD-10-CM

## 2025-05-12 DIAGNOSIS — N18.4 CKD (CHRONIC KIDNEY DISEASE) STAGE 4, GFR 15-29 ML/MIN (MULTI): ICD-10-CM

## 2025-05-12 DIAGNOSIS — E08.22 DIABETES MELLITUS DUE TO UNDERLYING CONDITION WITH DIABETIC CHRONIC KIDNEY DISEASE, UNSPECIFIED CKD STAGE, UNSPECIFIED WHETHER LONG TERM INSULIN USE: ICD-10-CM

## 2025-05-12 PROCEDURE — 99214 OFFICE O/P EST MOD 30 MIN: CPT | Performed by: INTERNAL MEDICINE

## 2025-05-12 PROCEDURE — 4010F ACE/ARB THERAPY RXD/TAKEN: CPT | Performed by: INTERNAL MEDICINE

## 2025-05-12 PROCEDURE — 1126F AMNT PAIN NOTED NONE PRSNT: CPT | Performed by: INTERNAL MEDICINE

## 2025-05-12 PROCEDURE — 3080F DIAST BP >= 90 MM HG: CPT | Performed by: INTERNAL MEDICINE

## 2025-05-12 PROCEDURE — 3077F SYST BP >= 140 MM HG: CPT | Performed by: INTERNAL MEDICINE

## 2025-05-12 RX ORDER — CHLORTHALIDONE 25 MG/1
25 TABLET ORAL DAILY
Qty: 90 TABLET | Refills: 3 | Status: SHIPPED | OUTPATIENT
Start: 2025-05-12 | End: 2026-05-12

## 2025-05-12 ASSESSMENT — PAIN SCALES - GENERAL: PAINLEVEL_OUTOF10: 0-NO PAIN

## 2025-05-12 NOTE — PROGRESS NOTES
Keaton Blanco   69 y.o.    @@  [facility]/Room: [id number]/Room/bed info not found    Subjective:   The patient is being seen for a routine clinic follow-up of chronic kidney disease. Recently, the disease has been stable. Disease complications:  No hyperkalemia, no hypocalcemia, no hyperphosphatemia, no metabolic acidosis, no coagulopathy, no uremic encephalopathy, no neuropathy and no renal osteodystrophy. The patient is currently asymptomatic. No associated symptoms are reported.       Meds:   Current Medications[1]       ROS:  The patient is awake and oriented. No dizziness or lightheadedness. No chills and no fever. No headaches. No nausea and no vomiting. No shortness of breath. No cough. No chest pain. No abdominal pain. No diarrhea. No hematemesis or hemoptysis. No hematuria. No rectal bleeding. No melena. No epistaxis. No urinary symptoms. No flank pain. No leg edema. No itching. Overall, the rest of the review of systems is also negative.  12 point review of systems otherwise negative as stated in HPI.        Physical Examination:        Vitals:    05/12/25 1100   BP: (!) 174/91   Pulse: 63   Temp: 36.1 °C (97 °F)   SpO2: 100%     General: The patient is awake, oriented, and is not in any distress.  Head and Neck: Normocephalic. No periorbital edema.  Eyes: non-icteric  Respiratory: Symmetric chest expansion. No respiratory distress.  Skin: No maculopapular rash.  Musculoskeletal: No peripheral edema.  Neuro Exam: Speech is fluent. Moves extremities.    Imaging:  === 03/29/23 ===    US RENAL COMPLETE    - Impression -  4.2 x 4.4 x 5.0 cm right interpolar region solid hypoechoic mass with  increased vascularity. This may represent a renal neoplasm. Recommend  further evaluation with dedicated CT or MRI of the kidneys.  Multiple bilateral renal cysts, some with internal septations as  described above. These too may be evaluated with dedicated CT or MRI  of the kidneys.    Yellow Alert:  The critical information  above was relayed directly by me by [employer]  system to CHARLI MATHIS on 4/6/2023 at 10:41 am.       Blood Labs:  No results found for this or any previous visit (from the past 24 hours).   Lab Results   Component Value Date    PTH 54.2 12/17/2024    PROTUR NEGATIVE 06/21/2023    PHOS 3.5 06/10/2024      Lab Results   Component Value Date    GLUCOSE 105 (H) 12/17/2024    CALCIUM 9.8 12/17/2024     12/17/2024    K 4.3 12/17/2024    CO2 25 12/17/2024     12/17/2024    BUN 54 (H) 12/17/2024    CREATININE 2.42 (H) 12/17/2024         Assessment and Plan:  67 year old Male  w/ Hx CKD3, T2DM, HTN,  renal mass s/p open right partial nephrectomy with Dr. Brown on 6/26/2023.      1- CKD III: Last Cr level from December 2024 is 2.42. I asked for BMP, PTH and 25-hydroxy vitamin D level.     2.  Hypertension.  Blood pressure is high.  He has been out of his chlorthalidone for about few weeks.  I sent a new prescription for chlorthalidone.        I will see him in about 6 months for follow-up.          Ethan Malone MD  Senior Attending Physician  Director of Onco-Nephrology Program  Division of Nephrology & Hypertension  [facility]       [1]   Current Outpatient Medications   Medication Sig Dispense Refill    allopurinol (Zyloprim) 100 mg tablet Take 1 tablet (100 mg) by mouth once daily. 30 tablet 3    amLODIPine (Norvasc) 10 mg tablet Take 1 tablet by mouth once daily 90 tablet 3    aspirin 81 mg EC tablet Take 1 tablet (81 mg) by mouth once daily.      atorvastatin (Lipitor) 80 mg tablet TAKE 1 TABLET BY MOUTH ONCE DAILY AT BEDTIME 90 tablet 0    carvedilol (Coreg) 25 mg tablet Take 1 tablet by mouth twice daily 180 tablet 3    losartan (Cozaar) 100 mg tablet Take 1 tablet (100 mg) by mouth once daily. 30 tablet 11     No current facility-administered medications for this visit.

## 2025-05-13 ENCOUNTER — LAB (OUTPATIENT)
Dept: LAB | Facility: HOSPITAL | Age: 70
End: 2025-05-13
Payer: COMMERCIAL

## 2025-05-13 DIAGNOSIS — E55.9 VITAMIN D DEFICIENCY: Primary | ICD-10-CM

## 2025-05-13 DIAGNOSIS — I10 ESSENTIAL (PRIMARY) HYPERTENSION: Primary | ICD-10-CM

## 2025-05-13 DIAGNOSIS — N18.4 CHRONIC KIDNEY DISEASE, STAGE 4 (SEVERE) (MULTI): ICD-10-CM

## 2025-05-13 DIAGNOSIS — E08.22 DIABETES MELLITUS DUE TO UNDERLYING CONDITION WITH DIABETIC CHRONIC KIDNEY DISEASE: ICD-10-CM

## 2025-05-13 LAB
25(OH)D3 SERPL-MCNC: 28 NG/ML (ref 30–100)
ANION GAP SERPL CALC-SCNC: 13 MMOL/L (ref 10–20)
BUN SERPL-MCNC: 27 MG/DL (ref 6–23)
CALCIUM SERPL-MCNC: 9.3 MG/DL (ref 8.6–10.6)
CHLORIDE SERPL-SCNC: 106 MMOL/L (ref 98–107)
CO2 SERPL-SCNC: 26 MMOL/L (ref 21–32)
CREAT SERPL-MCNC: 2.17 MG/DL (ref 0.5–1.3)
EGFRCR SERPLBLD CKD-EPI 2021: 33 ML/MIN/1.73M*2
GLUCOSE SERPL-MCNC: 102 MG/DL (ref 74–99)
POTASSIUM SERPL-SCNC: 4.3 MMOL/L (ref 3.5–5.3)
PTH-INTACT SERPL-MCNC: 75.9 PG/ML (ref 18.5–88)
SODIUM SERPL-SCNC: 141 MMOL/L (ref 136–145)

## 2025-05-13 PROCEDURE — 83970 ASSAY OF PARATHORMONE: CPT

## 2025-05-13 PROCEDURE — 80048 BASIC METABOLIC PNL TOTAL CA: CPT

## 2025-05-13 PROCEDURE — 82306 VITAMIN D 25 HYDROXY: CPT

## 2025-05-13 RX ORDER — ERGOCALCIFEROL 1.25 MG/1
50000 CAPSULE ORAL
Qty: 6 CAPSULE | Refills: 2 | Status: SHIPPED | OUTPATIENT
Start: 2025-05-13

## 2025-05-16 DIAGNOSIS — E78.5 DYSLIPIDEMIA: ICD-10-CM

## 2025-05-18 RX ORDER — ATORVASTATIN CALCIUM 80 MG/1
80 TABLET, FILM COATED ORAL NIGHTLY
Qty: 90 TABLET | Refills: 0 | Status: SHIPPED | OUTPATIENT
Start: 2025-05-18

## 2025-05-19 ENCOUNTER — TELEPHONE (OUTPATIENT)
Dept: NEPHROLOGY | Facility: CLINIC | Age: 70
End: 2025-05-19
Payer: COMMERCIAL

## 2025-05-19 NOTE — TELEPHONE ENCOUNTER
----- Message from Ethan Malone sent at 5/13/2025  4:07 PM EDT -----  Kidney function remains diminished but it is stable with no major change.  Vitamin D deficiency.  I sent prescription for ergocalciferol.  ----- Message -----  From: Lab, Background User  Sent: 5/13/2025   2:53 PM EDT  To: Ethan Malone MD

## 2025-07-07 ENCOUNTER — APPOINTMENT (OUTPATIENT)
Dept: PRIMARY CARE | Facility: CLINIC | Age: 70
End: 2025-07-07
Payer: COMMERCIAL

## 2025-07-07 VITALS
HEART RATE: 60 BPM | HEIGHT: 62 IN | OXYGEN SATURATION: 97 % | WEIGHT: 135 LBS | SYSTOLIC BLOOD PRESSURE: 148 MMHG | DIASTOLIC BLOOD PRESSURE: 77 MMHG | BODY MASS INDEX: 24.84 KG/M2

## 2025-07-07 DIAGNOSIS — Z12.5 SCREENING PSA (PROSTATE SPECIFIC ANTIGEN): ICD-10-CM

## 2025-07-07 DIAGNOSIS — C64.2 RENAL CELL CARCINOMA, LEFT: ICD-10-CM

## 2025-07-07 DIAGNOSIS — R73.9 HYPERGLYCEMIA: ICD-10-CM

## 2025-07-07 DIAGNOSIS — I50.22 CHRONIC SYSTOLIC CONGESTIVE HEART FAILURE: ICD-10-CM

## 2025-07-07 DIAGNOSIS — Z00.00 HEALTH CARE MAINTENANCE: ICD-10-CM

## 2025-07-07 DIAGNOSIS — N18.31 STAGE 3A CHRONIC KIDNEY DISEASE (MULTI): ICD-10-CM

## 2025-07-07 DIAGNOSIS — Z00.00 ROUTINE GENERAL MEDICAL EXAMINATION AT HEALTH CARE FACILITY: Primary | ICD-10-CM

## 2025-07-07 DIAGNOSIS — I10 BENIGN ESSENTIAL HYPERTENSION: ICD-10-CM

## 2025-07-07 DIAGNOSIS — I1A.0 RESISTANT HYPERTENSION: ICD-10-CM

## 2025-07-07 DIAGNOSIS — I10 ESSENTIAL (PRIMARY) HYPERTENSION: ICD-10-CM

## 2025-07-07 PROBLEM — N28.9 ABNORMAL KIDNEY FUNCTION: Status: RESOLVED | Noted: 2025-01-27 | Resolved: 2025-07-07

## 2025-07-07 PROCEDURE — 3078F DIAST BP <80 MM HG: CPT | Performed by: INTERNAL MEDICINE

## 2025-07-07 PROCEDURE — 1160F RVW MEDS BY RX/DR IN RCRD: CPT | Performed by: INTERNAL MEDICINE

## 2025-07-07 PROCEDURE — G0439 PPPS, SUBSEQ VISIT: HCPCS | Performed by: INTERNAL MEDICINE

## 2025-07-07 PROCEDURE — 1036F TOBACCO NON-USER: CPT | Performed by: INTERNAL MEDICINE

## 2025-07-07 PROCEDURE — 99397 PER PM REEVAL EST PAT 65+ YR: CPT | Performed by: INTERNAL MEDICINE

## 2025-07-07 PROCEDURE — 3008F BODY MASS INDEX DOCD: CPT | Performed by: INTERNAL MEDICINE

## 2025-07-07 PROCEDURE — 3077F SYST BP >= 140 MM HG: CPT | Performed by: INTERNAL MEDICINE

## 2025-07-07 PROCEDURE — 1159F MED LIST DOCD IN RCRD: CPT | Performed by: INTERNAL MEDICINE

## 2025-07-07 PROCEDURE — 1170F FXNL STATUS ASSESSED: CPT | Performed by: INTERNAL MEDICINE

## 2025-07-07 RX ORDER — DOXAZOSIN 2 MG/1
2 TABLET ORAL NIGHTLY
Qty: 90 TABLET | Refills: 3 | Status: SHIPPED | OUTPATIENT
Start: 2025-07-07 | End: 2026-07-02

## 2025-07-07 ASSESSMENT — ENCOUNTER SYMPTOMS
ARTHRALGIAS: 0
BLOOD IN STOOL: 0
APPETITE CHANGE: 0
VOMITING: 0
CHOKING: 0
DIFFICULTY URINATING: 0
HEADACHES: 0
HYPERACTIVE: 0
ABDOMINAL PAIN: 0
ABDOMINAL DISTENTION: 0
DYSPHORIC MOOD: 0
POLYPHAGIA: 0
EYE DISCHARGE: 0
SHORTNESS OF BREATH: 0
JOINT SWELLING: 0
LIGHT-HEADEDNESS: 0
NAUSEA: 0
FACIAL ASYMMETRY: 0
EYE REDNESS: 0
DIAPHORESIS: 0
NERVOUS/ANXIOUS: 0
SPEECH DIFFICULTY: 0
CHEST TIGHTNESS: 0
COUGH: 0
NUMBNESS: 0
FACIAL SWELLING: 0
ACTIVITY CHANGE: 0
POLYDIPSIA: 0
BACK PAIN: 0
AGITATION: 0
EYE PAIN: 0
EYE ITCHING: 0
FEVER: 0
CONFUSION: 0
FATIGUE: 0

## 2025-07-07 ASSESSMENT — ACTIVITIES OF DAILY LIVING (ADL)
DOING_HOUSEWORK: INDEPENDENT
TAKING_MEDICATION: INDEPENDENT
BATHING: INDEPENDENT
GROCERY_SHOPPING: INDEPENDENT
MANAGING_FINANCES: INDEPENDENT
DRESSING: INDEPENDENT

## 2025-07-07 NOTE — ASSESSMENT & PLAN NOTE
Orders:    Follow Up In [facility] - PCP - Medicare Annual    empagliflozin (Jardiance) 10 mg tablet; Take 1 tablet (10 mg) by mouth once daily.    Referral to Clinical Pharmacy; Future

## 2025-07-07 NOTE — ASSESSMENT & PLAN NOTE
Orders:    empagliflozin (Jardiance) 10 mg tablet; Take 1 tablet (10 mg) by mouth once daily.    Transthoracic echo (TTE) complete; Future    B-type natriuretic peptide; Future    Referral to Clinical Pharmacy; Future

## 2025-07-07 NOTE — PROGRESS NOTES
Subjective   Reason for Visit: Keaton Blanco is an 69 y.o. male here for a Medicare Wellness visit.     Past Medical, Surgical, and Family History reviewed and updated in chart.    Reviewed all medications by prescribing practitioner or clinical pharmacist (such as prescriptions, OTCs, herbal therapies and supplements) and documented in the medical record.    HPI    Patient is a 69-year-old male with past medical history of renal cell carcinoma status post excision, chronic kidney disease stage III hyperglycemia dyslipidemia and systolic congestive heart failure with reduced ejection fraction who presents for wellness.  No specific complaints at this time.  He follows with nephrology on a regular basis.  Renal function is stable however not currently on SGLT2.  He does take high-dose losartan.  No evidence of proteinuria.  He has systolic congestive heart failure not on SGLT2.  Nor is he on Entresto.  He does not tolerate MRA due to concerns about hyperkalemia.  No recent echocardiograms.  Last echocardiogram 40 to 45% ejection fraction.  He does have slight elevation in his HbA1c but it has been improving over the last year.    No other complaints at this time.  He eats a healthy diet including plant-based proteins and exercises quite regularly.    Patient is .  Able to perform all of his activities of daily living.    Patient Care Team:  Gregg Schuster DO as PCP - General (Internal Medicine)  Gregg Schuster DO as PCP - [facility] [employer] PCP     Review of Systems   Constitutional:  Negative for activity change, appetite change, diaphoresis, fatigue and fever.   HENT:  Negative for dental problem, drooling, ear pain, facial swelling, hearing loss and nosebleeds.    Eyes:  Negative for pain, discharge, redness and itching.   Respiratory:  Negative for cough, choking, chest tightness and shortness of breath.    Gastrointestinal:  Negative for abdominal distention, abdominal pain, blood in  "stool, nausea and vomiting.   Endocrine: Negative for cold intolerance, heat intolerance, polydipsia and polyphagia.   Genitourinary:  Negative for difficulty urinating.   Musculoskeletal:  Negative for arthralgias, back pain and joint swelling.   Allergic/Immunologic: Negative for environmental allergies and food allergies.   Neurological:  Negative for facial asymmetry, speech difficulty, light-headedness, numbness and headaches.   Psychiatric/Behavioral:  Negative for agitation, confusion and dysphoric mood. The patient is not nervous/anxious and is not hyperactive.        Objective   Vitals:  /77   Pulse 60   Ht 1.575 m (5' 2\")   Wt 61.2 kg (135 lb)   SpO2 97%   BMI 24.69 kg/m²       Physical Exam  Constitutional:       Appearance: Normal appearance.   HENT:      Head: Normocephalic and atraumatic.      Nose: No congestion or rhinorrhea.      Mouth/Throat:      Mouth: Mucous membranes are moist.   Eyes:      Pupils: Pupils are equal, round, and reactive to light.   Cardiovascular:      Rate and Rhythm: Normal rate and regular rhythm.      Pulses: Normal pulses.      Heart sounds: Normal heart sounds. No murmur heard.     No friction rub. No gallop.   Pulmonary:      Effort: Pulmonary effort is normal. No respiratory distress.      Breath sounds: Normal breath sounds. No stridor. No wheezing or rales.   Abdominal:      General: Abdomen is flat. Bowel sounds are normal. There is no distension.      Palpations: Abdomen is soft. There is no mass.      Tenderness: There is no guarding.      Hernia: No hernia is present.   Musculoskeletal:         General: No swelling. Normal range of motion.      Cervical back: No rigidity.      Right lower leg: No edema.      Left lower leg: No edema.   Skin:     General: Skin is warm and dry.   Neurological:      General: No focal deficit present.      Mental Status: He is alert and oriented to person, place, and time. Mental status is at baseline.      Motor: No " weakness.      Gait: Gait normal.   Psychiatric:         Mood and Affect: Mood normal.         Behavior: Behavior normal.         Thought Content: Thought content normal.         Assessment & Plan  Stage 3a chronic kidney disease (Multi)    Orders:    Follow Up In [facility] - PCP - Medicare Annual    empagliflozin (Jardiance) 10 mg tablet; Take 1 tablet (10 mg) by mouth once daily.    Referral to Clinical Pharmacy; Future    Hyperglycemia    Orders:    Follow Up In [facility] - PCP - Medicare Annual    Resistant hypertension    Orders:    Vascular US renal artery duplex complete; Future    doxazosin (Cardura) 2 mg tablet; Take 1 tablet (2 mg) by mouth once daily at bedtime.    empagliflozin (Jardiance) 10 mg tablet; Take 1 tablet (10 mg) by mouth once daily.    Referral to Clinical Pharmacy; Future    Follow Up In [facility] - PCP - Medicare Annual; Future    Essential (primary) hypertension    Orders:    Vascular US renal artery duplex complete; Future    Chronic systolic congestive heart failure    Orders:    empagliflozin (Jardiance) 10 mg tablet; Take 1 tablet (10 mg) by mouth once daily.    Transthoracic echo (TTE) complete; Future    B-type natriuretic peptide; Future    Referral to Clinical Pharmacy; Future    Screening PSA (prostate specific antigen)    Orders:    Prostate Spec.Ag,Screen; Future    Health care maintenance    Orders:    Referral to Dermatology    Routine general medical examination at health care facility    Orders:    1 Year Follow Up In [facility] - PCP - Wellness Exam; Future    Renal cell carcinoma, left  Continue following with nephrology for surveillance.         Benign essential hypertension  Given history of resistant hypertension we will start doxazosin.  Check renal artery ultrasound.  Advise low-salt diet.  Follow-up 6 months for reevaluation

## 2025-07-07 NOTE — PATIENT INSTRUCTIONS
We kindly ask that you take the lead and scheduling your referral appointments to ensure they align best with your availability by calling [phone].  For laboratory tests we encourage you to schedule an appointment online [email or web address] but walk-ins are available as well.  For radiology testing you can call [phone] or [phone] to schedule.  If for any reason you are having difficulty scheduling your appointments please feel free to reach out at our office by calling [phone] to assist further

## 2025-07-07 NOTE — ASSESSMENT & PLAN NOTE
Given history of resistant hypertension we will start doxazosin.  Check renal artery ultrasound.  Advise low-salt diet.  Follow-up 6 months for reevaluation

## 2025-07-14 NOTE — PROGRESS NOTES
Clinical Pharmacy Appointment    Patient ID: Keaton Blanco is a 69 y.o. male who presents for Hypertension, Chronic Kidney Disease, and Congestive Heart Failure.    Pt is here for First appointment.     Referring Provider: Gregg Schuster DO  PCP: Gregg Schuster DO  Last visit with PCP: 07/07/2025   Next visit with PCP: 01/07/2026    Subjective   Medication Reconciliation:  Changed: none  Added: none  Discontinued: none    Drug Interactions  No relevant drug interactions were noted.    Medication System Management  Patient's preferred pharmacy: [address]  Adherence/Organization: no  Affordability/Accessibility: jardiance around $800, pending approval for  PAP     Patient Assistance Program (PAP)    Application for program to be submitted for the following medications: Jardiance    Prescription Insurance:  Yes   County of Permanent Address:  [facility]   Members of Household:  2   Files Taxes:  Yes     Patient will drop off at PCP office.    Patient verbally reports monthly or yearly income which is less than 400% federal poverty level    If approved medication must be filled through [facility] and MEDICATION WILL BE MAILED TO PATIENT.    HPI  CHRONIC KIDNEY DISEASE ASSESSMENT  Does patient see nephrology? Yes    Date: 5/12/2025    Current medications include:  empagliflozin (Jardiance) 10 mg daily  losartan (Cozaar) 100 mg daily    Clarifications to above regimen: Patient applying to enroll in  PAP program in order to get Jardiance filled  Adverse Effects: n/a    Past medications include:  lisinopril 40 mg tablet    Stage: 3b (eGFR 30-44)  Albuminuria: A1 (<30 mg/g)  ACE/ARB: Yes, losartan 100 mg tablet   SGLT2i? Yes, Jardiance 10 mg tablet     Medication Review  Current medications and doses were reviewed and are appropriate per current kidney function.  The following medications increase risk of TRINA and should be closely monitored:  ARB,  "Diuretics, and NSAIDs    HYPERTENSION  Medication Therapy  Current Medications:   carvedilol (Coreg) 25 mg tablet twice daily   chlorthalidone (Hygroton) 25 mg daily  losartan (Cozaar) 100 mg daily  amLODIPine (Norvasc) 10 mg daily    Previous Medications: lisinopril 20 mg tablet    Clarifications to above regimen: n/a  Adverse Effects: n/a    Home BP Monitoring  BP Cuff Type: unknown  Cuff Validated? unknown    Current home BP readings: varies between 120's-140's SBP.   Previous home BP readings: n/a    BP Readings from Last 3 Encounters:   25 148/77   25 (!) 174/91   25 156/74       Lifestyle  Physical Activity and Diet: Per Dr. Schuster's note on 25, \"He eats a healthy diet including plant-based proteins\" and exercises 3-4 times a day. Patient walks his dog and does strength training. Patient stays away from salt, eats no red meat, and has a daily morning smoothie with fresh fruits and spinach.    Risk Assessment  Major Risk Factors Include:  Hypertension  Dyslipidemia  Microalbuminuria or eGFR < 60  Age > 55 (men) or > 65 (women)  The 10-year ASCVD risk score (Barb IBARRA, et al., 2019) is: 34.7%    Values used to calculate the score:      Age: 69 years      Sex: Male      Is Non- : Yes      Diabetic: Yes      Tobacco smoker: No      Systolic Blood Pressure: 148 mmHg      Is BP treated: Yes      HDL Cholesterol: 47.2 mg/dL      Total Cholesterol: 131 mg/dL  Known target organ damage:  Chronic Heart Failure  CKD    Secondary Prevention  On Statin?: Yes, atorvastatin (Lipitor) 80 mg daily  Immunizations Needed: All up-to-date and documented  Tobacco Use: non-smoker     Hyperlipidemia History:  Diagnosis? yes  Current Regimen  atorvastatin (Lipitor) 80 mg daily  At goal? yes, from 3/8/2024  Current LDL: 76  Current T    Diabetes History:  Diagnosis? no    Lab Review  Electrolytes: Within normal limits (Sodium 141, Potassium 4.3, Chloride 106, Calcium 9.3 from " 5/13/2025)  Alk phos: needs updated, last 8/1/2023  Sodium bicarb: Within normal limits (26 from 5/13/2025)  PTH: Within normal limits (75.9 from 5/13/2025)  Hgb: Need updated labs, last 7/13/2023  Albuminuria:   ALBUMIN/CREATININE RATIO, RANDOM URINE   Date Value Ref Range Status   05/05/2025 17 <30 mg/g creat Final     Comment:        The ADA defines abnormalities in albumin  excretion as follows:     Albuminuria Category        Result (mg/g creatinine)     Normal to Mildly increased   <30  Moderately increased            Severely increased           > OR = 300     The ADA recommends that at least two of three  specimens collected within a 3-6 month period be  abnormal before considering a patient to be  within a diagnostic category.       Albumin/Creatinine Ratio   Date Value Ref Range Status   06/10/2024 17.3 <30.0 ug/mg Creat Final   03/08/2024 10.2 <30.0 ug/mg Creat Final      CONGESTIVE HEART FAILURE ASSESSMENT  Does patient follow with Cardiology: Yes    Date: 1/28/2025    Staging  Ejection Fraction: 25-30% (July 2021)  NYHA Class: Class I - No limitations to physical activity  ACC/AHA Stage: C - Disease with symptoms    Symptom Assessment  Weight changes/edema?: No  Dyspnea?: None  Dizziness/syncope/palpitations?: No    Medication Therapy  Current Regimen (GDMT):  ARNI/ACEi/ARB: Yes - losartan 100 mg daily  Beta Blocker: Yes - carvedilol 25 mg tablet twice daily   MRA: No  SGLT2i: Yes - Jardiance 10 mg daily    Previous Medications: lisinopril 20 mg tablet and spironolactone 25 mg tablet    Clarifications to above regimen: Patient applying to enroll in  PAP program in order to get Jardiance filled  Adverse Effects: n/a    Aspirin 81 mg? yes  Statin?: Yes - atorvastatin 80 mg tablet   HTN?: Yes - 148/77 as of 07/07/2025    Objective   Allergies[1]  Social History     Social History Narrative    Not on file      Medication Review  Current Outpatient Medications   Medication Instructions    allopurinol  (ZYLOPRIM) 100 mg, oral, Daily    amLODIPine (NORVASC) 10 mg, oral, Daily    aspirin 81 mg, Daily    atorvastatin (LIPITOR) 80 mg, oral, Nightly    carvedilol (COREG) 25 mg, oral, 2 times daily    chlorthalidone (HYGROTON) 25 mg, oral, Daily    doxazosin (CARDURA) 2 mg, oral, Nightly    empagliflozin (JARDIANCE) 10 mg, oral, Daily    ergocalciferol (VITAMIN D-2) 50,000 Units, oral, Every 14 days    losartan (COZAAR) 100 mg, oral, Daily      Vitals  BP Readings from Last 2 Encounters:   07/07/25 148/77   05/12/25 (!) 174/91     BMI Readings from Last 1 Encounters:   07/07/25 24.69 kg/m²      Labs  A1C  Lab Results   Component Value Date    HGBA1C 6.0 (H) 05/05/2025    HGBA1C 6.0 (H) 03/08/2024    HGBA1C 6.1 (A) 03/29/2023     BMP  Lab Results   Component Value Date    CALCIUM 9.3 05/13/2025     05/13/2025    K 4.3 05/13/2025    CO2 26 05/13/2025     05/13/2025    BUN 27 (H) 05/13/2025    CREATININE 2.17 (H) 05/13/2025    EGFR 33 (L) 05/13/2025     LFTs  Lab Results   Component Value Date    ALT 19 07/17/2023    AST 24 07/17/2023    ALKPHOS 89 07/17/2023    BILITOT 0.6 07/17/2023     FLP  Lab Results   Component Value Date    TRIG 41 03/08/2024    CHOL 131 03/08/2024    LDLF 79 03/29/2023    LDLCALC 76 03/08/2024    HDL 47.2 03/08/2024     Urine Microalbumin  Lab Results   Component Value Date    MICROALBCREA 17 05/05/2025     Weight Management  Wt Readings from Last 3 Encounters:   07/07/25 61.2 kg (135 lb)   05/12/25 66.7 kg (147 lb)   02/11/25 65.3 kg (143 lb 15.4 oz)      There is no height or weight on file to calculate BMI.     Assessment/Plan   Problem List Items Addressed This Visit       Chronic systolic congestive heart failure - Primary    Patient has Stage C Class I HFrEF with most recent EF 25-30%. Patient is not on complete GDMT.     Rationale for plan: Patient is on an ARB and a Beta Blocker, per CHF GDMT. Patient is not on a MRA due to poor kidney function (eGFR 33). Patient is ordered  Jardiance 10 mg tablet, however cannot afford the medication. Patient applied for  PAP enrollment in order to fill this medication.     Medication Changes:  START  Jardiance 10 mg tablet by mouth daily.    Monitoring and Education:  Patient counseled on the administration, by mouth daily in the morning, as well as counseled on the side effects of increased urination side effect and increased risk of genitourinary infections.  Patient was educated on both the cardiovascular and renal benefits of Jardiance.  Weigh yourself without clothing daily after using the bathroom first thing in the morning before breakfast   Contact your physician/seek help immediately if you notice the following with symptoms of shortness of breath or swelling in your extremities:   Weight gain of 3+ lbs overnight   Weight gain of 5+ lbs in a week   Physical limitations to your normal physical activity level   Limit fluid intake as instructed by your doctor and follow a heart friendly diet low in salt, fat, and focused in lean meats   Aim to exercise for 30 minutes anywhere between 3 to 5 times a week or more, depending on your physical limitations   Keep a log of your daily BP, HR and weight to share with providers   If you are a smoker or drink alcohol, consider cessation to improve your heart health           Kidney disease, chronic, stage III (GFR 30-59 ml/min) (Multi)    ASSESSMENT OF CHRONIC KIDNEY DISEASE  Patient's CKD is stable, but fluctuating  Rationale for plan: SGLT2(-) recommended for treatment of CKD per KDIGO guidelines.    Medication Changes:  START  Jardiance 10 mg tablet by mouth daily    Monitoring and Education:  Reviewed CKD lab results and benefits of good hydration  Discussed avoidance of NSAIDs, and use of Tylenol for headaches/pain  Recommended need for good control of blood pressure and blood glucose  Advised to report any changes in fluid retention, weight or decreased urinary output             Clinical  Pharmacist follow-up: 8/18/2025, Telehealth visit  Patient is not followed in [facility].    Continue all meds under the continuation of care with the referring provider and clinical pharmacy team.    Thank you,  Mago Jacobo   Meds PGY1 Pharmacy Resident   [phone]    Verbal consent to manage patient's drug therapy was obtained from the patient. They were informed they may decline to participate or withdraw from participation in pharmacy services at any time.         [1] No Known Allergies

## 2025-07-16 ENCOUNTER — APPOINTMENT (OUTPATIENT)
Dept: PHARMACY | Facility: HOSPITAL | Age: 70
End: 2025-07-16
Payer: COMMERCIAL

## 2025-07-16 DIAGNOSIS — E79.0 ELEVATED URIC ACID IN BLOOD: ICD-10-CM

## 2025-07-16 DIAGNOSIS — I50.22 CHRONIC SYSTOLIC CONGESTIVE HEART FAILURE: Primary | ICD-10-CM

## 2025-07-16 DIAGNOSIS — N18.32 STAGE 3B CHRONIC KIDNEY DISEASE (MULTI): ICD-10-CM

## 2025-07-16 NOTE — ASSESSMENT & PLAN NOTE
ASSESSMENT OF CHRONIC KIDNEY DISEASE  Patient's CKD is stable, but fluctuating  Rationale for plan: SGLT2(-) recommended for treatment of CKD per KDIGO guidelines.    Medication Changes:  START  Jardiance 10 mg tablet by mouth daily    Monitoring and Education:  Reviewed CKD lab results and benefits of good hydration  Discussed avoidance of NSAIDs, and use of Tylenol for headaches/pain  Recommended need for good control of blood pressure and blood glucose  Advised to report any changes in fluid retention, weight or decreased urinary output

## 2025-07-16 NOTE — ASSESSMENT & PLAN NOTE
Patient has Stage C Class I HFrEF with most recent EF 25-30%. Patient is not on complete GDMT.     Rationale for plan: Patient is on an ARB and a Beta Blocker, per CHF GDMT. Patient is not on a MRA due to poor kidney function (eGFR 33). Patient is ordered Jardiance 10 mg tablet, however cannot afford the medication. Patient applied for  PAP enrollment in order to fill this medication.     Medication Changes:  START  Jardiance 10 mg tablet by mouth daily.    Monitoring and Education:  Patient counseled on the administration, by mouth daily in the morning, as well as counseled on the side effects of increased urination side effect and increased risk of genitourinary infections.  Patient was educated on both the cardiovascular and renal benefits of Jardiance.  Weigh yourself without clothing daily after using the bathroom first thing in the morning before breakfast   Contact your physician/seek help immediately if you notice the following with symptoms of shortness of breath or swelling in your extremities:   Weight gain of 3+ lbs overnight   Weight gain of 5+ lbs in a week   Physical limitations to your normal physical activity level   Limit fluid intake as instructed by your doctor and follow a heart friendly diet low in salt, fat, and focused in lean meats   Aim to exercise for 30 minutes anywhere between 3 to 5 times a week or more, depending on your physical limitations   Keep a log of your daily BP, HR and weight to share with providers   If you are a smoker or drink alcohol, consider cessation to improve your heart health

## 2025-07-17 RX ORDER — ALLOPURINOL 100 MG/1
100 TABLET ORAL DAILY
Qty: 30 TABLET | Refills: 0 | Status: SHIPPED | OUTPATIENT
Start: 2025-07-17

## 2025-07-23 LAB
BNP SERPL-MCNC: 41 PG/ML
PSA SERPL-MCNC: 2.36 NG/ML

## 2025-07-24 PROCEDURE — RXMED WILLOW AMBULATORY MEDICATION CHARGE

## 2025-07-25 ENCOUNTER — PHARMACY VISIT (OUTPATIENT)
Dept: PHARMACY | Facility: CLINIC | Age: 70
End: 2025-07-25
Payer: MEDICARE

## 2025-07-29 ENCOUNTER — APPOINTMENT (OUTPATIENT)
Dept: CARDIOLOGY | Facility: HOSPITAL | Age: 70
End: 2025-07-29
Payer: COMMERCIAL

## 2025-07-29 VITALS
OXYGEN SATURATION: 100 % | HEART RATE: 62 BPM | SYSTOLIC BLOOD PRESSURE: 151 MMHG | HEIGHT: 65 IN | BODY MASS INDEX: 22.39 KG/M2 | DIASTOLIC BLOOD PRESSURE: 84 MMHG | WEIGHT: 134.4 LBS

## 2025-07-29 DIAGNOSIS — I50.22 CHRONIC SYSTOLIC CONGESTIVE HEART FAILURE: ICD-10-CM

## 2025-07-29 DIAGNOSIS — I10 BENIGN ESSENTIAL HYPERTENSION: ICD-10-CM

## 2025-07-29 DIAGNOSIS — I25.10 ARTERIOSCLEROSIS OF CORONARY ARTERY: Primary | ICD-10-CM

## 2025-07-29 DIAGNOSIS — E78.5 DYSLIPIDEMIA: ICD-10-CM

## 2025-07-29 PROCEDURE — 1160F RVW MEDS BY RX/DR IN RCRD: CPT | Performed by: INTERNAL MEDICINE

## 2025-07-29 PROCEDURE — 99212 OFFICE O/P EST SF 10 MIN: CPT | Mod: 25

## 2025-07-29 PROCEDURE — 1159F MED LIST DOCD IN RCRD: CPT | Performed by: INTERNAL MEDICINE

## 2025-07-29 PROCEDURE — G2211 COMPLEX E/M VISIT ADD ON: HCPCS | Performed by: INTERNAL MEDICINE

## 2025-07-29 PROCEDURE — 99214 OFFICE O/P EST MOD 30 MIN: CPT | Performed by: INTERNAL MEDICINE

## 2025-07-29 PROCEDURE — 1126F AMNT PAIN NOTED NONE PRSNT: CPT | Performed by: INTERNAL MEDICINE

## 2025-07-29 PROCEDURE — 3008F BODY MASS INDEX DOCD: CPT | Performed by: INTERNAL MEDICINE

## 2025-07-29 PROCEDURE — 1036F TOBACCO NON-USER: CPT | Performed by: INTERNAL MEDICINE

## 2025-07-29 PROCEDURE — 93010 ELECTROCARDIOGRAM REPORT: CPT | Performed by: INTERNAL MEDICINE

## 2025-07-29 PROCEDURE — 3079F DIAST BP 80-89 MM HG: CPT | Performed by: INTERNAL MEDICINE

## 2025-07-29 PROCEDURE — 93005 ELECTROCARDIOGRAM TRACING: CPT | Performed by: INTERNAL MEDICINE

## 2025-07-29 PROCEDURE — 3077F SYST BP >= 140 MM HG: CPT | Performed by: INTERNAL MEDICINE

## 2025-07-29 ASSESSMENT — COLUMBIA-SUICIDE SEVERITY RATING SCALE - C-SSRS
1. IN THE PAST MONTH, HAVE YOU WISHED YOU WERE DEAD OR WISHED YOU COULD GO TO SLEEP AND NOT WAKE UP?: NO
6. HAVE YOU EVER DONE ANYTHING, STARTED TO DO ANYTHING, OR PREPARED TO DO ANYTHING TO END YOUR LIFE?: NO
2. HAVE YOU ACTUALLY HAD ANY THOUGHTS OF KILLING YOURSELF?: NO

## 2025-07-29 ASSESSMENT — ENCOUNTER SYMPTOMS
DEPRESSION: 0
OCCASIONAL FEELINGS OF UNSTEADINESS: 0
LOSS OF SENSATION IN FEET: 0

## 2025-07-29 ASSESSMENT — PAIN SCALES - GENERAL: PAINLEVEL_OUTOF10: 0-NO PAIN

## 2025-07-29 NOTE — PROGRESS NOTES
Subjective   Keaton Blanco is a 69 y.o. male who presents to the [facility] for follow up of hypertensive heart disease with systolic heart failure. Last seen in January 2025.     Since our last visit, Mr. Blanco has no active cardiac symptoms of chest pain, dyspnea on exertion, PND, orthopnea, NOLBERTO, palpitations, syncope, or claudication.     He continue routine exercise program with NYHA class I symptoms. Mr. Blanco walks his dog 2-3 miles and does moderate intensity exercise without dyspnea 3-4 times per week.      Has not had to take Lasix 40 mg tablets since last visit.    He was able to start Jardiance in 2024 for cardiac and renal protection.      Admitted with hypertensive emergency and found to have newly diagnosed systolic heart failure (LV EF 25-30%) in July 2021. Had symptoms of exertional dyspnea, headache, fatigue prior to admission. Since discharge, home BP trend is now 120-130 mm / 70s mm Hg. Elevated BP today but patient states high anxiety at today's visit.      Past Medical History:  1. Chronic systolic heart failure: 7/28/2021 LV EF 25% -> 10/12/2021 LV EF 40-45%  2. Coronary arteriosclerosis: 10/13/2021 CT calcium score 909  3. Hypertension  4. Dyslipidemia  5. Impaired glucose tolerance (7/2021 A1c 6.0%; 3/2022 6.3%)  6. CKD stage 3 (Cr b/l 1.7)     Social History:  Never a smoker     Family History:  No premature CAD in 1st degree relatives ( 55 years of age for male relatives, 65 years of age for female relatives)     Review of Systems    A 14 point review of systems was asked. All questions were negative except for pertinent positives listed in the HPI.     Current Outpatient Medications on File Prior to Visit   Medication Sig Dispense Refill    allopurinol (Zyloprim) 100 mg tablet Take 1 tablet by mouth once daily 30 tablet 0    amLODIPine (Norvasc) 10 mg tablet Take 1 tablet by mouth once daily 90 tablet 3    aspirin 81 mg EC tablet Take 1 tablet (81 mg) by  "mouth once daily.      atorvastatin (Lipitor) 80 mg tablet Take 1 tablet (80 mg) by mouth once daily at bedtime. 90 tablet 0    carvedilol (Coreg) 25 mg tablet Take 1 tablet by mouth twice daily 180 tablet 3    chlorthalidone (Hygroton) 25 mg tablet Take 1 tablet (25 mg) by mouth once daily. 90 tablet 3    doxazosin (Cardura) 2 mg tablet Take 1 tablet (2 mg) by mouth once daily at bedtime. 90 tablet 3    empagliflozin (Jardiance) 10 mg tablet Take 1 tablet (10 mg) by mouth once daily. 100 tablet 3    ergocalciferol (Vitamin D-2) 1250 mcg (50,000 units) capsule Take 1 capsule (50,000 Units) by mouth every 14 (fourteen) days. 6 capsule 2    losartan (Cozaar) 100 mg tablet Take 1 tablet (100 mg) by mouth once daily. 30 tablet 11    [DISCONTINUED] empagliflozin (Jardiance) 10 mg tablet Take 1 tablet (10 mg) by mouth once daily. 100 tablet 3     No current facility-administered medications on file prior to visit.     Objective   Physical Exam  BP Readings from Last 3 Encounters:   07/29/25 151/84   07/07/25 148/77   05/12/25 (!) 174/91      Wt Readings from Last 3 Encounters:   07/29/25 61 kg (134 lb 6.4 oz)   07/07/25 61.2 kg (135 lb)   05/12/25 66.7 kg (147 lb)      BMI: Estimated body mass index is 22.37 kg/m² as calculated from the following:    Height as of this encounter: 1.651 m (5' 5\").    Weight as of this encounter: 61 kg (134 lb 6.4 oz).  BSA: Estimated body surface area is 1.67 meters squared as calculated from the following:    Height as of this encounter: 1.651 m (5' 5\").    Weight as of this encounter: 61 kg (134 lb 6.4 oz).    General: no acute distress  HEENT: EOMI, no scleral icterus.  Lungs: Clear to auscultation bilaterally without wheezing, rales, or rhonchi.  Cardiovascular: Regular rhythm and rate. Normal S1 and S2. No murmurs, rubs, or gallops are appreciated. JVP normal.  Abdomen: Soft, nontender, nondistended. Bowel sounds present.  Extremities: Warm and well perfused with equal 2+ pulses " "bilaterally.  No edema present.  Neurologic: Alert and oriented x3.    I have personally reviewed the following images and laboratory findings:  Last echocardiogram:  Most recent echo, 10/12/2021: LV EF 40-45%, borderline conc LVH (LVMI 113 gm/m2), impaired relaxation diastology (E/e' 12), upper limits of normal LA size (DEWAYNE 32 ml/m2), normal RV/RA, mild AI, trace TR, trace MR, no RVSP measured, mild Asc Ao enlargement (4.0 cm).     Prior echo, 2021: LV EF 25-30%, mod LVH (LVMI 153 gm/m2), mild LV dilation (LVEDD/ESD 5.2 / 4.3 cm), impaired relaxation diastology (E/e' 9.9), normal LA size (DEWAYNE 19 ml/m2), normal RV/RA, no AI, mild MR, mild TR, RVSP 21 mm Hg, Asc Ao 3.8 cm.     Last cath / stress test / CACS:  10/2021 CT calcium score: 909    Most recent EC2025 ECG: Sinus bradycardia, 53 bpm, otherwise normal ECG. Personally reviewed in office.    2024 ECG: Sinus rhythm, 52 bpm, otherwise normal ECG. Personally reviewed in office.    2023 ECG: Sinus rhythm, 66 bpm, normal ECG. Personally reviewed in office.     Lab Results   Component Value Date    CHOL 131 2024    CHOL 142 2023    CHOL 123 2022     Lab Results   Component Value Date    HDL 47.2 2024    HDL 51.2 2023    HDL 45.6 2022     Lab Results   Component Value Date    LDLCALC 76 2024     Lab Results   Component Value Date    TRIG 41 2024    TRIG 59 2023    TRIG 46 2022     No components found for: \"CHOLHDL\"      Assessment/Plan   1. Chronic systolic heart failure:  2021 TTE: LV EF 25-30% -> 10/12/2021 echo 40-45%. Continue neurohormonal remodeling with carvedilol 25 mg twice a day, and empagliflozin 10 mg a day. Continued losartan 100 mg a day. Spironolactone stopped for CKD and hyperkalemia.    Lasix 40 mg PRN for 2 lb weight gain, has not used since our last visit in 2022. No indication for ICD now that LV EF > 35%.    Echocardiogram ordered by primary for " resistant hypertension. His SGLT2i was restarted at 7/7/2025, patient had been off SGLT2i for a period of time since our prior January 2025 visit.     2. Coronary arteriosclerosis / Dyslipidemia:  10/13/2021 CT calcium score high at 909. Taking aspirin 81 mg a day and high intensity statin (atorvastatin 80 mg a day). LDL near goal < 70 on March 2024. Lifestyle modification program review of heart healthy diet and aerobic exercise.     3. Hypertension:  Home BP now 130/70s since starting doxazosin 2 mg and increase in May 2025 chlorthalidone to 25 mg a day. Goal blood pressure range 120-130 mm Hg.    Continue losartan 100 mg a day, chlorthalidone 25 mg a day, amlodipine 10 mg a day, carvedilol 25 mg twice a day. Low sodium diet (6216-4594 mg sodium a day). Empagliflozin recently restarted. Doxazosin added by primary care.     Follow up with Dr. Hernandez in 6 months.            SIGNATURE: Sarath Hernandez MD PATIENT NAME: Keaton Blanco   DATE/TIME: July 29, 2025 2:31 PM MRN: [id number]

## 2025-07-29 NOTE — PATIENT INSTRUCTIONS
You were seen in the [facility] for your history of high blood pressure and systolic heart failure.     Your echocardiogram in October 2021 showed that your heart's squeezing strength (ejection fraction) had improved to 40-45% from 25% in July 2021 with heart failure medication therapy. Your pumping strength is now mildly reduced and near the cut off of 50% and above that would be normal range.     Schedule your repeat echocardiogram ordered by Dr. Schuster.     We are treating your heart failure with the following medications:  1. Carvedilol 25 mg twice a day  2. Losartan 100 mg a day   3. Jardiance 10 mg a day     For blood pressure, you are also taking amlodipine 10 mg a day. Dr. Malone started chlorthalidone 25 mg a day at May 2025 visit. Dr. Schuster recently added doxazosin 2 mg at night this month.     Check your blood pressure every morning about 30-60 minutes after taking your morning blood pressure medications and keep a log book. Sit for 3-5 minutes in a chair to relax and place your arm on a table or counter to be level with your heart.      Eat a low salt diet (sodium limit of 6640-1341 mg a day) to help lower your blood pressure. Extra sodium causes an increase in the volume of blood inside your blood vessels and this increases your blood pressure.      Your October 2021 CT calcium score was high at 909. We are treating your heart artery atherosclerosis with aspirin 81 mg a day, atorvastatin 80 mg a day, and lifestyle modification program of heart healthy eat and aerobic exercise.     Eat a heart healthy diet. Limit portions of red meat. Eat fresh fruits and vegetables instead of processed carbohydrates. Olive oil (1 tablespoon a day), avocados, tree nuts as a source of omega-3 fat. Beans and legumes are good sources of plant based protein and fiber. The Mediterranean diet has been shown in clinical trials to reduce risk of heart disease by following these principles.     From a  heart standpoint, beet root juice is safe to drink and will not interact with your current medications.     Aerobic exercise 30 minutes 3-5 times a week can help lower blood pressure and protect your heart.      Your March 2024 fasting cholesterol blood work was at goal taking atorvastatin 80 mg a day.      Follow up with Dr. Hernandez in 6 months.

## 2025-08-01 LAB
ATRIAL RATE: 53 BPM
P AXIS: 58 DEGREES
P OFFSET: 192 MS
P ONSET: 123 MS
PR INTERVAL: 194 MS
Q ONSET: 220 MS
QRS COUNT: 9 BEATS
QRS DURATION: 110 MS
QT INTERVAL: 418 MS
QTC CALCULATION(BAZETT): 392 MS
QTC FREDERICIA: 401 MS
R AXIS: 37 DEGREES
T AXIS: 48 DEGREES
T OFFSET: 429 MS
VENTRICULAR RATE: 53 BPM

## 2025-08-12 DIAGNOSIS — E78.5 DYSLIPIDEMIA: ICD-10-CM

## 2025-08-12 RX ORDER — ATORVASTATIN CALCIUM 80 MG/1
80 TABLET, FILM COATED ORAL NIGHTLY
Qty: 90 TABLET | Refills: 1 | Status: SHIPPED | OUTPATIENT
Start: 2025-08-12

## 2025-08-13 DIAGNOSIS — E78.5 DYSLIPIDEMIA: ICD-10-CM

## 2025-08-14 DIAGNOSIS — E79.0 ELEVATED URIC ACID IN BLOOD: ICD-10-CM

## 2025-08-14 RX ORDER — ATORVASTATIN CALCIUM 80 MG/1
80 TABLET, FILM COATED ORAL NIGHTLY
Qty: 90 TABLET | Refills: 0 | OUTPATIENT
Start: 2025-08-14

## 2025-08-15 RX ORDER — ALLOPURINOL 100 MG/1
100 TABLET ORAL DAILY
Qty: 30 TABLET | Refills: 4 | Status: SHIPPED | OUTPATIENT
Start: 2025-08-15

## 2025-08-18 ENCOUNTER — APPOINTMENT (OUTPATIENT)
Dept: PHARMACY | Facility: HOSPITAL | Age: 70
End: 2025-08-18
Payer: COMMERCIAL

## 2025-08-18 DIAGNOSIS — N18.32 STAGE 3B CHRONIC KIDNEY DISEASE (MULTI): ICD-10-CM

## 2025-08-18 DIAGNOSIS — I50.22 CHRONIC SYSTOLIC CONGESTIVE HEART FAILURE: ICD-10-CM

## 2025-08-19 ENCOUNTER — APPOINTMENT (OUTPATIENT)
Dept: CARDIOLOGY | Facility: HOSPITAL | Age: 70
End: 2025-08-19
Payer: COMMERCIAL

## 2025-08-19 ENCOUNTER — HOSPITAL ENCOUNTER (OUTPATIENT)
Dept: CARDIOLOGY | Facility: HOSPITAL | Age: 70
Discharge: HOME | End: 2025-08-19
Payer: COMMERCIAL

## 2025-08-19 DIAGNOSIS — I50.20 UNSPECIFIED SYSTOLIC (CONGESTIVE) HEART FAILURE: ICD-10-CM

## 2025-08-19 DIAGNOSIS — I50.22 CHRONIC SYSTOLIC CONGESTIVE HEART FAILURE: ICD-10-CM

## 2025-08-19 LAB
AORTIC VALVE MEAN GRADIENT: 3 MMHG
AORTIC VALVE PEAK VELOCITY: 1.23 M/S
AV PEAK GRADIENT: 6 MMHG
AVA (PEAK VEL): 2.37 CM2
AVA (VTI): 2.42 CM2
EJECTION FRACTION APICAL 4 CHAMBER: 57.3
EJECTION FRACTION: 63 %
LEFT ATRIUM VOLUME AREA LENGTH INDEX BSA: 30.9 ML/M2
LEFT VENTRICLE INTERNAL DIMENSION DIASTOLE: 5.12 CM (ref 3.5–6)
LEFT VENTRICULAR OUTFLOW TRACT DIAMETER: 1.96 CM
MITRAL VALVE E/A RATIO: 0.77
RIGHT VENTRICLE FREE WALL PEAK S': 8 CM/S
RIGHT VENTRICLE PEAK SYSTOLIC PRESSURE: 38 MMHG
TRICUSPID ANNULAR PLANE SYSTOLIC EXCURSION: 2.2 CM

## 2025-08-19 PROCEDURE — 93306 TTE W/DOPPLER COMPLETE: CPT | Performed by: INTERNAL MEDICINE

## 2025-08-19 PROCEDURE — 93306 TTE W/DOPPLER COMPLETE: CPT

## 2026-01-07 ENCOUNTER — APPOINTMENT (OUTPATIENT)
Dept: PRIMARY CARE | Facility: CLINIC | Age: 71
End: 2026-01-07
Payer: COMMERCIAL